# Patient Record
Sex: MALE | Race: WHITE | Employment: OTHER | URBAN - METROPOLITAN AREA
[De-identification: names, ages, dates, MRNs, and addresses within clinical notes are randomized per-mention and may not be internally consistent; named-entity substitution may affect disease eponyms.]

---

## 2018-01-01 ENCOUNTER — APPOINTMENT (OUTPATIENT)
Dept: GENERAL RADIOLOGY | Age: 73
DRG: 871 | End: 2018-01-01
Payer: MEDICARE

## 2018-01-01 ENCOUNTER — APPOINTMENT (OUTPATIENT)
Dept: CT IMAGING | Age: 73
DRG: 871 | End: 2018-01-01
Payer: MEDICARE

## 2018-01-01 ENCOUNTER — HOSPITAL ENCOUNTER (INPATIENT)
Age: 73
LOS: 7 days | DRG: 871 | End: 2018-10-23
Attending: EMERGENCY MEDICINE | Admitting: INTERNAL MEDICINE
Payer: MEDICARE

## 2018-01-01 VITALS
HEIGHT: 69 IN | TEMPERATURE: 97.8 F | OXYGEN SATURATION: 91 % | HEART RATE: 109 BPM | SYSTOLIC BLOOD PRESSURE: 152 MMHG | RESPIRATION RATE: 20 BRPM | DIASTOLIC BLOOD PRESSURE: 76 MMHG | WEIGHT: 108.3 LBS | BODY MASS INDEX: 16.04 KG/M2

## 2018-01-01 DIAGNOSIS — J96.92 RESPIRATORY FAILURE WITH HYPOXIA AND HYPERCAPNIA, UNSPECIFIED CHRONICITY (HCC): Primary | ICD-10-CM

## 2018-01-01 DIAGNOSIS — J96.91 RESPIRATORY FAILURE WITH HYPOXIA AND HYPERCAPNIA, UNSPECIFIED CHRONICITY (HCC): Primary | ICD-10-CM

## 2018-01-01 DIAGNOSIS — E87.1 HYPO-OSMOLAR HYPONATREMIA: ICD-10-CM

## 2018-01-01 LAB
AADO2: 206.1 MMHG
AADO2: 206.4 MMHG
ALBUMIN SERPL-MCNC: 3.1 G/DL (ref 3.5–5.2)
ALBUMIN SERPL-MCNC: 3.8 G/DL (ref 3.5–5.2)
ALP BLD-CCNC: 117 U/L (ref 40–129)
ALP BLD-CCNC: 161 U/L (ref 40–129)
ALT SERPL-CCNC: 21 U/L (ref 0–40)
ALT SERPL-CCNC: 22 U/L (ref 0–40)
AMMONIA: 25 UMOL/L (ref 16–60)
ANION GAP SERPL CALCULATED.3IONS-SCNC: 10 MMOL/L (ref 7–16)
ANION GAP SERPL CALCULATED.3IONS-SCNC: 11 MMOL/L (ref 7–16)
ANION GAP SERPL CALCULATED.3IONS-SCNC: 12 MMOL/L (ref 7–16)
ANION GAP SERPL CALCULATED.3IONS-SCNC: 13 MMOL/L (ref 7–16)
ANION GAP SERPL CALCULATED.3IONS-SCNC: 13 MMOL/L (ref 7–16)
ANION GAP SERPL CALCULATED.3IONS-SCNC: 15 MMOL/L (ref 7–16)
ANION GAP SERPL CALCULATED.3IONS-SCNC: 6 MMOL/L (ref 7–16)
ANION GAP SERPL CALCULATED.3IONS-SCNC: 6 MMOL/L (ref 7–16)
ANION GAP SERPL CALCULATED.3IONS-SCNC: 7 MMOL/L (ref 7–16)
ANION GAP SERPL CALCULATED.3IONS-SCNC: 8 MMOL/L (ref 7–16)
ANION GAP SERPL CALCULATED.3IONS-SCNC: 8 MMOL/L (ref 7–16)
ANION GAP SERPL CALCULATED.3IONS-SCNC: 9 MMOL/L (ref 7–16)
ANISOCYTOSIS: ABNORMAL
AST SERPL-CCNC: 35 U/L (ref 0–39)
AST SERPL-CCNC: 43 U/L (ref 0–39)
B.E.: 1.5 MMOL/L (ref -3–3)
B.E.: 2 MMOL/L (ref -3–3)
B.E.: 7.3 MMOL/L (ref -3–3)
B.E.: 8.7 MMOL/L (ref -3–0)
BACTERIA: ABNORMAL /HPF
BACTERIA: ABNORMAL /HPF
BASOPHILS ABSOLUTE: 0 E9/L (ref 0–0.2)
BASOPHILS ABSOLUTE: 0.01 E9/L (ref 0–0.2)
BASOPHILS RELATIVE PERCENT: 0.1 % (ref 0–2)
BASOPHILS RELATIVE PERCENT: 0.2 % (ref 0–2)
BILIRUB SERPL-MCNC: 0.6 MG/DL (ref 0–1.2)
BILIRUB SERPL-MCNC: 1.2 MG/DL (ref 0–1.2)
BILIRUBIN URINE: NEGATIVE
BILIRUBIN URINE: NEGATIVE
BLOOD, URINE: ABNORMAL
BLOOD, URINE: ABNORMAL
BUN BLDV-MCNC: 10 MG/DL (ref 8–23)
BUN BLDV-MCNC: 11 MG/DL (ref 8–23)
BUN BLDV-MCNC: 7 MG/DL (ref 8–23)
BUN BLDV-MCNC: 8 MG/DL (ref 8–23)
BUN BLDV-MCNC: 9 MG/DL (ref 8–23)
BURR CELLS: ABNORMAL
C-REACTIVE PROTEIN: 6.6 MG/DL (ref 0–0.4)
CALCIUM IONIZED: 1.05 MMOL/L (ref 1.15–1.33)
CALCIUM SERPL-MCNC: 7.4 MG/DL (ref 8.6–10.2)
CALCIUM SERPL-MCNC: 7.6 MG/DL (ref 8.6–10.2)
CALCIUM SERPL-MCNC: 7.7 MG/DL (ref 8.6–10.2)
CALCIUM SERPL-MCNC: 7.7 MG/DL (ref 8.6–10.2)
CALCIUM SERPL-MCNC: 7.8 MG/DL (ref 8.6–10.2)
CALCIUM SERPL-MCNC: 7.9 MG/DL (ref 8.6–10.2)
CALCIUM SERPL-MCNC: 8 MG/DL (ref 8.6–10.2)
CALCIUM SERPL-MCNC: 8.1 MG/DL (ref 8.6–10.2)
CALCIUM SERPL-MCNC: 8.4 MG/DL (ref 8.6–10.2)
CALCIUM SERPL-MCNC: 8.5 MG/DL (ref 8.6–10.2)
CALCIUM SERPL-MCNC: 8.6 MG/DL (ref 8.6–10.2)
CALCIUM SERPL-MCNC: 9 MG/DL (ref 8.6–10.2)
CHLORIDE BLD-SCNC: 69 MMOL/L (ref 98–107)
CHLORIDE BLD-SCNC: 76 MMOL/L (ref 98–107)
CHLORIDE BLD-SCNC: 77 MMOL/L (ref 98–107)
CHLORIDE BLD-SCNC: 78 MMOL/L (ref 98–107)
CHLORIDE BLD-SCNC: 79 MMOL/L (ref 98–107)
CHLORIDE BLD-SCNC: 80 MMOL/L (ref 98–107)
CHLORIDE BLD-SCNC: 81 MMOL/L (ref 98–107)
CHLORIDE BLD-SCNC: 81 MMOL/L (ref 98–107)
CHLORIDE BLD-SCNC: 82 MMOL/L (ref 98–107)
CHLORIDE BLD-SCNC: 82 MMOL/L (ref 98–107)
CHLORIDE BLD-SCNC: 83 MMOL/L (ref 98–107)
CHLORIDE BLD-SCNC: 85 MMOL/L (ref 98–107)
CHLORIDE BLD-SCNC: 85 MMOL/L (ref 98–107)
CHLORIDE BLD-SCNC: 86 MMOL/L (ref 98–107)
CHLORIDE BLD-SCNC: 87 MMOL/L (ref 98–107)
CHLORIDE BLD-SCNC: 88 MMOL/L (ref 98–107)
CHLORIDE BLD-SCNC: 92 MMOL/L (ref 98–107)
CHLORIDE BLD-SCNC: 92 MMOL/L (ref 98–107)
CHLORIDE BLD-SCNC: 93 MMOL/L (ref 98–107)
CHLORIDE URINE RANDOM: 27 MMOL/L
CHLORIDE URINE RANDOM: 27 MMOL/L
CHLORIDE URINE RANDOM: 84 MMOL/L
CLARITY: ABNORMAL
CLARITY: ABNORMAL
CO2: 28 MMOL/L (ref 22–29)
CO2: 29 MMOL/L (ref 22–29)
CO2: 30 MMOL/L (ref 22–29)
CO2: 31 MMOL/L (ref 22–29)
CO2: 32 MMOL/L (ref 22–29)
CO2: 32 MMOL/L (ref 22–29)
CO2: 33 MMOL/L (ref 22–29)
CO2: 34 MMOL/L (ref 22–29)
CO2: 35 MMOL/L (ref 22–29)
CO2: 36 MMOL/L (ref 22–29)
COHB: 1.1 % (ref 0–1.5)
COHB: 1.8 % (ref 0–1.5)
COHB: 2.5 % (ref 0–1.5)
COLOR: YELLOW
COLOR: YELLOW
CORTISOL TOTAL: 16.8 MCG/DL (ref 2.68–18.4)
CREAT SERPL-MCNC: 0.3 MG/DL (ref 0.7–1.2)
CREAT SERPL-MCNC: 0.4 MG/DL (ref 0.7–1.2)
CREATININE URINE: 72 MG/DL (ref 40–278)
CREATININE URINE: 84 MG/DL (ref 40–278)
CRITICAL: ABNORMAL
DATE ANALYZED: ABNORMAL
DATE OF COLLECTION: ABNORMAL
DELIVERY SYSTEMS: ABNORMAL
DEVICE: ABNORMAL
EKG ATRIAL RATE: 102 BPM
EKG P AXIS: 86 DEGREES
EKG P-R INTERVAL: 146 MS
EKG Q-T INTERVAL: 360 MS
EKG QRS DURATION: 102 MS
EKG QTC CALCULATION (BAZETT): 469 MS
EKG R AXIS: -66 DEGREES
EKG T AXIS: 86 DEGREES
EKG VENTRICULAR RATE: 102 BPM
EOSINOPHILS ABSOLUTE: 0 E9/L (ref 0.05–0.5)
EOSINOPHILS ABSOLUTE: 0.02 E9/L (ref 0.05–0.5)
EOSINOPHILS ABSOLUTE: 0.03 E9/L (ref 0.05–0.5)
EOSINOPHILS ABSOLUTE: 0.06 E9/L (ref 0.05–0.5)
EOSINOPHILS RELATIVE PERCENT: 0.2 % (ref 0–6)
EOSINOPHILS RELATIVE PERCENT: 0.5 % (ref 0–6)
EOSINOPHILS RELATIVE PERCENT: 0.5 % (ref 0–6)
EOSINOPHILS RELATIVE PERCENT: 0.6 % (ref 0–6)
ETHANOL: 25 MG/DL (ref 0–0.08)
FILM ARRAY ADENOVIRUS: NORMAL
FILM ARRAY BORDETELLA PERTUSSIS: NORMAL
FILM ARRAY CHLAMYDOPHILIA PNEUMONIAE: NORMAL
FILM ARRAY CORONAVIRUS 229E: NORMAL
FILM ARRAY CORONAVIRUS HKU1: NORMAL
FILM ARRAY CORONAVIRUS NL63: NORMAL
FILM ARRAY CORONAVIRUS OC43: NORMAL
FILM ARRAY INFLUENZA A VIRUS 09H1: NORMAL
FILM ARRAY INFLUENZA A VIRUS H1: NORMAL
FILM ARRAY INFLUENZA A VIRUS H3: NORMAL
FILM ARRAY INFLUENZA A VIRUS: NORMAL
FILM ARRAY INFLUENZA B: NORMAL
FILM ARRAY METAPNEUMOVIRUS: NORMAL
FILM ARRAY MYCOPLASMA PNEUMONIAE: NORMAL
FILM ARRAY PARAINFLUENZA VIRUS 1: NORMAL
FILM ARRAY PARAINFLUENZA VIRUS 2: NORMAL
FILM ARRAY PARAINFLUENZA VIRUS 3: NORMAL
FILM ARRAY PARAINFLUENZA VIRUS 4: NORMAL
FILM ARRAY RESPIRATORY SYNCITIAL VIRUS: NORMAL
FILM ARRAY RHINOVIRUS/ENTEROVIRUS: NORMAL
FIO2 ARTERIAL: 3
FIO2: 50 %
FIO2: 50 %
GFR AFRICAN AMERICAN: >60
GFR NON-AFRICAN AMERICAN: >60 ML/MIN/1.73
GLUCOSE BLD-MCNC: 104 MG/DL (ref 74–109)
GLUCOSE BLD-MCNC: 105 MG/DL (ref 74–109)
GLUCOSE BLD-MCNC: 113 MG/DL (ref 74–109)
GLUCOSE BLD-MCNC: 117 MG/DL (ref 74–109)
GLUCOSE BLD-MCNC: 119 MG/DL (ref 74–109)
GLUCOSE BLD-MCNC: 121 MG/DL (ref 74–109)
GLUCOSE BLD-MCNC: 122 MG/DL (ref 74–109)
GLUCOSE BLD-MCNC: 122 MG/DL (ref 74–109)
GLUCOSE BLD-MCNC: 127 MG/DL (ref 74–109)
GLUCOSE BLD-MCNC: 129 MG/DL (ref 74–109)
GLUCOSE BLD-MCNC: 133 MG/DL (ref 74–109)
GLUCOSE BLD-MCNC: 134 MG/DL (ref 74–109)
GLUCOSE BLD-MCNC: 135 MG/DL (ref 74–109)
GLUCOSE BLD-MCNC: 136 MG/DL (ref 74–109)
GLUCOSE BLD-MCNC: 140 MG/DL (ref 74–109)
GLUCOSE BLD-MCNC: 141 MG/DL (ref 74–109)
GLUCOSE BLD-MCNC: 142 MG/DL (ref 74–109)
GLUCOSE BLD-MCNC: 147 MG/DL (ref 74–109)
GLUCOSE BLD-MCNC: 148 MG/DL (ref 74–109)
GLUCOSE BLD-MCNC: 156 MG/DL (ref 74–109)
GLUCOSE BLD-MCNC: 160 MG/DL (ref 74–109)
GLUCOSE BLD-MCNC: 178 MG/DL (ref 74–109)
GLUCOSE BLD-MCNC: 88 MG/DL (ref 74–109)
GLUCOSE BLD-MCNC: 99 MG/DL (ref 74–109)
GLUCOSE URINE: NEGATIVE MG/DL
GLUCOSE URINE: NEGATIVE MG/DL
HCO3 ARTERIAL: 36.3 MMOL/L (ref 22–26)
HCO3: 29.4 MMOL/L (ref 22–26)
HCO3: 30 MMOL/L (ref 22–26)
HCO3: 33.8 MMOL/L (ref 22–26)
HCT VFR BLD CALC: 31 % (ref 37–54)
HCT VFR BLD CALC: 38.3 % (ref 37–54)
HCT VFR BLD CALC: 39.2 % (ref 37–54)
HCT VFR BLD CALC: 45.7 % (ref 37–54)
HEMOGLOBIN: 10.3 G/DL (ref 12.5–16.5)
HEMOGLOBIN: 13.2 G/DL (ref 12.5–16.5)
HEMOGLOBIN: 13.2 G/DL (ref 12.5–16.5)
HEMOGLOBIN: 16.4 G/DL (ref 12.5–16.5)
HHB: 44.7 % (ref 0–5)
HHB: 5 % (ref 0–5)
HHB: 5.9 % (ref 0–5)
IMMATURE GRANULOCYTES #: 0.04 E9/L
IMMATURE GRANULOCYTES #: 0.04 E9/L
IMMATURE GRANULOCYTES #: 0.08 E9/L
IMMATURE GRANULOCYTES %: 0.5 % (ref 0–5)
IMMATURE GRANULOCYTES %: 0.6 % (ref 0–5)
IMMATURE GRANULOCYTES %: 0.7 % (ref 0–5)
KETONES, URINE: 15 MG/DL
KETONES, URINE: 40 MG/DL
L. PNEUMOPHILA SEROGP 1 UR AG: NORMAL
LAB: 9558
LAB: ABNORMAL
LAB: ABNORMAL
LACTIC ACID: 2 MMOL/L (ref 0.5–2.2)
LEUKOCYTE ESTERASE, URINE: ABNORMAL
LEUKOCYTE ESTERASE, URINE: ABNORMAL
LV EF: 38 %
LVEF MODALITY: NORMAL
LYMPHOCYTES ABSOLUTE: 0.13 E9/L (ref 1.5–4)
LYMPHOCYTES ABSOLUTE: 0.14 E9/L (ref 1.5–4)
LYMPHOCYTES ABSOLUTE: 0.36 E9/L (ref 1.5–4)
LYMPHOCYTES ABSOLUTE: 0.96 E9/L (ref 1.5–4)
LYMPHOCYTES RELATIVE PERCENT: 0.9 % (ref 20–42)
LYMPHOCYTES RELATIVE PERCENT: 1 % (ref 20–42)
LYMPHOCYTES RELATIVE PERCENT: 16.4 % (ref 20–42)
LYMPHOCYTES RELATIVE PERCENT: 4.2 % (ref 20–42)
Lab: ABNORMAL
MAGNESIUM: 1.9 MG/DL (ref 1.6–2.6)
MAGNESIUM: 2 MG/DL (ref 1.6–2.6)
MAGNESIUM: 2.1 MG/DL (ref 1.6–2.6)
MCH RBC QN AUTO: 33.2 PG (ref 26–35)
MCH RBC QN AUTO: 33.3 PG (ref 26–35)
MCH RBC QN AUTO: 33.3 PG (ref 26–35)
MCH RBC QN AUTO: 33.8 PG (ref 26–35)
MCHC RBC AUTO-ENTMCNC: 33.2 % (ref 32–34.5)
MCHC RBC AUTO-ENTMCNC: 33.7 % (ref 32–34.5)
MCHC RBC AUTO-ENTMCNC: 34.5 % (ref 32–34.5)
MCHC RBC AUTO-ENTMCNC: 35.9 % (ref 32–34.5)
MCV RBC AUTO: 100.3 FL (ref 80–99.9)
MCV RBC AUTO: 94.2 FL (ref 80–99.9)
MCV RBC AUTO: 96.7 FL (ref 80–99.9)
MCV RBC AUTO: 98.5 FL (ref 80–99.9)
METHB: 0.2 % (ref 0–1.5)
METHB: 0.3 % (ref 0–1.5)
METHB: 0.3 % (ref 0–1.5)
MODE: ABNORMAL
MONOCYTES ABSOLUTE: 0.16 E9/L (ref 0.1–0.95)
MONOCYTES ABSOLUTE: 0.46 E9/L (ref 0.1–0.95)
MONOCYTES ABSOLUTE: 0.56 E9/L (ref 0.1–0.95)
MONOCYTES ABSOLUTE: 0.62 E9/L (ref 0.1–0.95)
MONOCYTES RELATIVE PERCENT: 1.3 % (ref 2–12)
MONOCYTES RELATIVE PERCENT: 10.6 % (ref 2–12)
MONOCYTES RELATIVE PERCENT: 4.3 % (ref 2–12)
MONOCYTES RELATIVE PERCENT: 5.4 % (ref 2–12)
MRSA CULTURE ONLY: NORMAL
NEUTROPHILS ABSOLUTE: 12.19 E9/L (ref 1.8–7.3)
NEUTROPHILS ABSOLUTE: 13.3 E9/L (ref 1.8–7.3)
NEUTROPHILS ABSOLUTE: 4.18 E9/L (ref 1.8–7.3)
NEUTROPHILS ABSOLUTE: 7.66 E9/L (ref 1.8–7.3)
NEUTROPHILS RELATIVE PERCENT: 71.6 % (ref 43–80)
NEUTROPHILS RELATIVE PERCENT: 89.6 % (ref 43–80)
NEUTROPHILS RELATIVE PERCENT: 94.8 % (ref 43–80)
NEUTROPHILS RELATIVE PERCENT: 96.5 % (ref 43–80)
NITRITE, URINE: POSITIVE
NITRITE, URINE: POSITIVE
O2 CONTENT: 11 ML/DL
O2 CONTENT: 18.5 ML/DL
O2 CONTENT: 19.2 ML/DL
O2 SATURATION: 54 % (ref 92–98.5)
O2 SATURATION: 94 % (ref 92–98.5)
O2 SATURATION: 94.9 % (ref 92–98.5)
O2 SATURATION: 97.8 % (ref 92–98.5)
O2HB: 52.5 % (ref 94–97)
O2HB: 92 % (ref 94–97)
O2HB: 93.7 % (ref 94–97)
OPERATOR ID: 1868
OPERATOR ID: 1868
OPERATOR ID: 1893
OPERATOR ID: 1994
ORGANISM: ABNORMAL
OSMOLALITY URINE: 453 MOSM/KG (ref 300–900)
OSMOLALITY URINE: 508 MOSM/KG (ref 300–900)
OSMOLALITY URINE: 526 MOSM/KG (ref 300–900)
OSMOLALITY: 257 MOSM/KG (ref 285–310)
PATIENT TEMP: 37 C
PCO2 ARTERIAL: 62.4 MMHG (ref 35–45)
PCO2: 55.5 MMHG (ref 35–45)
PCO2: 57.8 MMHG (ref 35–45)
PCO2: 64.8 MMHG (ref 35–45)
PDW BLD-RTO: 12.1 FL (ref 11.5–15)
PDW BLD-RTO: 12.2 FL (ref 11.5–15)
PDW BLD-RTO: 12.3 FL (ref 11.5–15)
PDW BLD-RTO: 12.7 FL (ref 11.5–15)
PEEP/CPAP: 6 CMH2O
PEEP/CPAP: 6 CMH2O
PFO2: 1.46 MMHG/%
PFO2: 1.5 MMHG/%
PH BLOOD GAS: 7.28 (ref 7.35–7.45)
PH BLOOD GAS: 7.33 (ref 7.35–7.45)
PH BLOOD GAS: 7.37 (ref 7.35–7.45)
PH BLOOD GAS: 7.4 (ref 7.35–7.45)
PH UA: 6 (ref 5–9)
PH UA: 6 (ref 5–9)
PHOSPHORUS: 1.2 MG/DL (ref 2.5–4.5)
PHOSPHORUS: 2.2 MG/DL (ref 2.5–4.5)
PHOSPHORUS: 2.3 MG/DL (ref 2.5–4.5)
PHOSPHORUS: 2.9 MG/DL (ref 2.5–4.5)
PHOSPHORUS: 3.5 MG/DL (ref 2.5–4.5)
PLATELET # BLD: 179 E9/L (ref 130–450)
PLATELET # BLD: 209 E9/L (ref 130–450)
PLATELET # BLD: 241 E9/L (ref 130–450)
PLATELET # BLD: 306 E9/L (ref 130–450)
PMV BLD AUTO: 9.2 FL (ref 7–12)
PMV BLD AUTO: 9.4 FL (ref 7–12)
PMV BLD AUTO: 9.6 FL (ref 7–12)
PMV BLD AUTO: 9.7 FL (ref 7–12)
PO2 ARTERIAL: 107.8 MMHG (ref 60–80)
PO2: 31.4 MMHG (ref 60–100)
PO2: 72.9 MMHG (ref 60–100)
PO2: 75.2 MMHG (ref 60–100)
POC CHLORIDE: 77 MMOL/L (ref 100–108)
POC CREATININE: 0.4 MG/DL (ref 0.7–1.2)
POC POTASSIUM: 2.8 MMOL/L (ref 3.5–5)
POC SODIUM: 117 MMOL/L (ref 132–146)
POIKILOCYTES: ABNORMAL
POTASSIUM REFLEX MAGNESIUM: 5 MMOL/L (ref 3.5–5)
POTASSIUM SERPL-SCNC: 3 MMOL/L (ref 3.5–5)
POTASSIUM SERPL-SCNC: 3.4 MMOL/L (ref 3.5–5)
POTASSIUM SERPL-SCNC: 3.4 MMOL/L (ref 3.5–5)
POTASSIUM SERPL-SCNC: 3.5 MMOL/L (ref 3.5–5)
POTASSIUM SERPL-SCNC: 3.8 MMOL/L (ref 3.5–5)
POTASSIUM SERPL-SCNC: 3.8 MMOL/L (ref 3.5–5)
POTASSIUM SERPL-SCNC: 3.9 MMOL/L (ref 3.5–5)
POTASSIUM SERPL-SCNC: 4 MMOL/L (ref 3.5–5)
POTASSIUM SERPL-SCNC: 4.1 MMOL/L (ref 3.5–5)
POTASSIUM SERPL-SCNC: 4.2 MMOL/L (ref 3.5–5)
POTASSIUM SERPL-SCNC: 4.2 MMOL/L (ref 3.5–5)
POTASSIUM SERPL-SCNC: 4.3 MMOL/L (ref 3.5–5)
POTASSIUM SERPL-SCNC: 4.4 MMOL/L (ref 3.5–5)
POTASSIUM SERPL-SCNC: 5.2 MMOL/L (ref 3.5–5)
POTASSIUM SERPL-SCNC: 6.9 MMOL/L (ref 3.5–5)
POTASSIUM, UR: 12 MMOL/L
POTASSIUM, UR: 12.8 MMOL/L
POTASSIUM, UR: 18.2 MMOL/L
PRO-BNP: 3451 PG/ML (ref 0–125)
PROCALCITONIN: 0.06 NG/ML (ref 0–0.08)
PROCALCITONIN: 0.07 NG/ML (ref 0–0.08)
PROCALCITONIN: 0.08 NG/ML (ref 0–0.08)
PROTEIN UA: 30 MG/DL
PROTEIN UA: ABNORMAL MG/DL
PS: 12 CMH20
PS: 12 CMH20
RBC # BLD: 3.09 E12/L (ref 3.8–5.8)
RBC # BLD: 3.96 E12/L (ref 3.8–5.8)
RBC # BLD: 3.98 E12/L (ref 3.8–5.8)
RBC # BLD: 4.85 E12/L (ref 3.8–5.8)
RBC UA: ABNORMAL /HPF (ref 0–2)
RBC UA: ABNORMAL /HPF (ref 0–2)
RI(T): 2.74
RI(T): 283 %
SCHISTOCYTES: ABNORMAL
SODIUM BLD-SCNC: 118 MMOL/L (ref 132–146)
SODIUM BLD-SCNC: 119 MMOL/L (ref 132–146)
SODIUM BLD-SCNC: 120 MMOL/L (ref 132–146)
SODIUM BLD-SCNC: 120 MMOL/L (ref 132–146)
SODIUM BLD-SCNC: 121 MMOL/L (ref 132–146)
SODIUM BLD-SCNC: 123 MMOL/L (ref 132–146)
SODIUM BLD-SCNC: 124 MMOL/L (ref 132–146)
SODIUM BLD-SCNC: 125 MMOL/L (ref 132–146)
SODIUM BLD-SCNC: 127 MMOL/L (ref 132–146)
SODIUM BLD-SCNC: 128 MMOL/L (ref 132–146)
SODIUM BLD-SCNC: 128 MMOL/L (ref 132–146)
SODIUM BLD-SCNC: 129 MMOL/L (ref 132–146)
SODIUM BLD-SCNC: 129 MMOL/L (ref 132–146)
SODIUM BLD-SCNC: 132 MMOL/L (ref 132–146)
SODIUM BLD-SCNC: 132 MMOL/L (ref 132–146)
SODIUM BLD-SCNC: 134 MMOL/L (ref 132–146)
SODIUM URINE: 51 MMOL/L
SODIUM URINE: <20 MMOL/L
SODIUM URINE: <20 MMOL/L
SOURCE, BLOOD GAS: ABNORMAL
SPECIFIC GRAVITY UA: 1.02 (ref 1–1.03)
SPECIFIC GRAVITY UA: >=1.03 (ref 1–1.03)
STREP PNEUMONIAE ANTIGEN, URINE: NORMAL
THB: 14 G/DL (ref 11.5–16.5)
THB: 14.8 G/DL (ref 11.5–16.5)
THB: 14.9 G/DL (ref 11.5–16.5)
TIME ANALYZED: 1533
TIME ANALYZED: 1627
TIME ANALYZED: 2244
TOTAL CK: 135 U/L (ref 20–200)
TOTAL PROTEIN: 5.6 G/DL (ref 6.4–8.3)
TOTAL PROTEIN: 6.7 G/DL (ref 6.4–8.3)
TROPONIN: 0.02 NG/ML (ref 0–0.03)
URINE CULTURE, ROUTINE: ABNORMAL
URINE CULTURE, ROUTINE: ABNORMAL
UROBILINOGEN, URINE: 0.2 E.U./DL
UROBILINOGEN, URINE: 1 E.U./DL
VITAMIN D 1,25-DIHYDROXY: 69.1 PG/ML (ref 19.9–79.3)
WBC # BLD: 12.6 E9/L (ref 4.5–11.5)
WBC # BLD: 14 E9/L (ref 4.5–11.5)
WBC # BLD: 5.8 E9/L (ref 4.5–11.5)
WBC # BLD: 8.6 E9/L (ref 4.5–11.5)
WBC UA: ABNORMAL /HPF (ref 0–5)
WBC UA: ABNORMAL /HPF (ref 0–5)

## 2018-01-01 PROCEDURE — 6370000000 HC RX 637 (ALT 250 FOR IP): Performed by: INTERNAL MEDICINE

## 2018-01-01 PROCEDURE — 6360000002 HC RX W HCPCS: Performed by: STUDENT IN AN ORGANIZED HEALTH CARE EDUCATION/TRAINING PROGRAM

## 2018-01-01 PROCEDURE — 94660 CPAP INITIATION&MGMT: CPT

## 2018-01-01 PROCEDURE — 2500000003 HC RX 250 WO HCPCS: Performed by: INTERNAL MEDICINE

## 2018-01-01 PROCEDURE — 6360000002 HC RX W HCPCS: Performed by: INTERNAL MEDICINE

## 2018-01-01 PROCEDURE — 94640 AIRWAY INHALATION TREATMENT: CPT

## 2018-01-01 PROCEDURE — 86140 C-REACTIVE PROTEIN: CPT

## 2018-01-01 PROCEDURE — 84100 ASSAY OF PHOSPHORUS: CPT

## 2018-01-01 PROCEDURE — 6370000000 HC RX 637 (ALT 250 FOR IP): Performed by: NURSE PRACTITIONER

## 2018-01-01 PROCEDURE — 84484 ASSAY OF TROPONIN QUANT: CPT

## 2018-01-01 PROCEDURE — 80053 COMPREHEN METABOLIC PANEL: CPT

## 2018-01-01 PROCEDURE — G8978 MOBILITY CURRENT STATUS: HCPCS

## 2018-01-01 PROCEDURE — 6370000000 HC RX 637 (ALT 250 FOR IP)

## 2018-01-01 PROCEDURE — 87581 M.PNEUMON DNA AMP PROBE: CPT

## 2018-01-01 PROCEDURE — 2580000003 HC RX 258: Performed by: INTERNAL MEDICINE

## 2018-01-01 PROCEDURE — 6370000000 HC RX 637 (ALT 250 FOR IP): Performed by: FAMILY MEDICINE

## 2018-01-01 PROCEDURE — 82330 ASSAY OF CALCIUM: CPT

## 2018-01-01 PROCEDURE — 85025 COMPLETE CBC W/AUTO DIFF WBC: CPT

## 2018-01-01 PROCEDURE — 92610 EVALUATE SWALLOWING FUNCTION: CPT

## 2018-01-01 PROCEDURE — 2140000000 HC CCU INTERMEDIATE R&B

## 2018-01-01 PROCEDURE — 82436 ASSAY OF URINE CHLORIDE: CPT

## 2018-01-01 PROCEDURE — 97530 THERAPEUTIC ACTIVITIES: CPT

## 2018-01-01 PROCEDURE — 51702 INSERT TEMP BLADDER CATH: CPT

## 2018-01-01 PROCEDURE — 2580000003 HC RX 258: Performed by: STUDENT IN AN ORGANIZED HEALTH CARE EDUCATION/TRAINING PROGRAM

## 2018-01-01 PROCEDURE — 83880 ASSAY OF NATRIURETIC PEPTIDE: CPT

## 2018-01-01 PROCEDURE — 6370000000 HC RX 637 (ALT 250 FOR IP): Performed by: STUDENT IN AN ORGANIZED HEALTH CARE EDUCATION/TRAINING PROGRAM

## 2018-01-01 PROCEDURE — 6360000002 HC RX W HCPCS: Performed by: NURSE PRACTITIONER

## 2018-01-01 PROCEDURE — 82550 ASSAY OF CK (CPK): CPT

## 2018-01-01 PROCEDURE — 2000000000 HC ICU R&B

## 2018-01-01 PROCEDURE — 2700000000 HC OXYGEN THERAPY PER DAY

## 2018-01-01 PROCEDURE — 83605 ASSAY OF LACTIC ACID: CPT

## 2018-01-01 PROCEDURE — 94761 N-INVAS EAR/PLS OXIMETRY MLT: CPT

## 2018-01-01 PROCEDURE — 84300 ASSAY OF URINE SODIUM: CPT

## 2018-01-01 PROCEDURE — 83935 ASSAY OF URINE OSMOLALITY: CPT

## 2018-01-01 PROCEDURE — 96375 TX/PRO/DX INJ NEW DRUG ADDON: CPT

## 2018-01-01 PROCEDURE — 96365 THER/PROPH/DIAG IV INF INIT: CPT

## 2018-01-01 PROCEDURE — 87633 RESP VIRUS 12-25 TARGETS: CPT

## 2018-01-01 PROCEDURE — 71260 CT THORAX DX C+: CPT

## 2018-01-01 PROCEDURE — 87798 DETECT AGENT NOS DNA AMP: CPT

## 2018-01-01 PROCEDURE — 82947 ASSAY GLUCOSE BLOOD QUANT: CPT

## 2018-01-01 PROCEDURE — 84132 ASSAY OF SERUM POTASSIUM: CPT

## 2018-01-01 PROCEDURE — 97162 PT EVAL MOD COMPLEX 30 MIN: CPT

## 2018-01-01 PROCEDURE — 71045 X-RAY EXAM CHEST 1 VIEW: CPT

## 2018-01-01 PROCEDURE — 2500000003 HC RX 250 WO HCPCS: Performed by: FAMILY MEDICINE

## 2018-01-01 PROCEDURE — 87088 URINE BACTERIA CULTURE: CPT

## 2018-01-01 PROCEDURE — 83735 ASSAY OF MAGNESIUM: CPT

## 2018-01-01 PROCEDURE — 87081 CULTURE SCREEN ONLY: CPT

## 2018-01-01 PROCEDURE — 84145 PROCALCITONIN (PCT): CPT

## 2018-01-01 PROCEDURE — 87186 SC STD MICRODIL/AGAR DIL: CPT

## 2018-01-01 PROCEDURE — 99222 1ST HOSP IP/OBS MODERATE 55: CPT | Performed by: NURSE PRACTITIONER

## 2018-01-01 PROCEDURE — 36415 COLL VENOUS BLD VENIPUNCTURE: CPT

## 2018-01-01 PROCEDURE — 80048 BASIC METABOLIC PNL TOTAL CA: CPT

## 2018-01-01 PROCEDURE — 81001 URINALYSIS AUTO W/SCOPE: CPT

## 2018-01-01 PROCEDURE — 96367 TX/PROPH/DG ADDL SEQ IV INF: CPT

## 2018-01-01 PROCEDURE — 97165 OT EVAL LOW COMPLEX 30 MIN: CPT

## 2018-01-01 PROCEDURE — 84133 ASSAY OF URINE POTASSIUM: CPT

## 2018-01-01 PROCEDURE — 6360000004 HC RX CONTRAST MEDICATION: Performed by: RADIOLOGY

## 2018-01-01 PROCEDURE — G8988 SELF CARE GOAL STATUS: HCPCS

## 2018-01-01 PROCEDURE — 99285 EMERGENCY DEPT VISIT HI MDM: CPT

## 2018-01-01 PROCEDURE — 82533 TOTAL CORTISOL: CPT

## 2018-01-01 PROCEDURE — 94760 N-INVAS EAR/PLS OXIMETRY 1: CPT

## 2018-01-01 PROCEDURE — 93306 TTE W/DOPPLER COMPLETE: CPT

## 2018-01-01 PROCEDURE — 82805 BLOOD GASES W/O2 SATURATION: CPT

## 2018-01-01 PROCEDURE — 2580000003 HC RX 258: Performed by: FAMILY MEDICINE

## 2018-01-01 PROCEDURE — 82435 ASSAY OF BLOOD CHLORIDE: CPT

## 2018-01-01 PROCEDURE — G8987 SELF CARE CURRENT STATUS: HCPCS

## 2018-01-01 PROCEDURE — G0480 DRUG TEST DEF 1-7 CLASSES: HCPCS

## 2018-01-01 PROCEDURE — 99232 SBSQ HOSP IP/OBS MODERATE 35: CPT | Performed by: NURSE PRACTITIONER

## 2018-01-01 PROCEDURE — 82803 BLOOD GASES ANY COMBINATION: CPT

## 2018-01-01 PROCEDURE — 87450 HC DIRECT STREP B ANTIGEN: CPT

## 2018-01-01 PROCEDURE — 82565 ASSAY OF CREATININE: CPT

## 2018-01-01 PROCEDURE — 2500000003 HC RX 250 WO HCPCS: Performed by: STUDENT IN AN ORGANIZED HEALTH CARE EDUCATION/TRAINING PROGRAM

## 2018-01-01 PROCEDURE — 82570 ASSAY OF URINE CREATININE: CPT

## 2018-01-01 PROCEDURE — 82652 VIT D 1 25-DIHYDROXY: CPT

## 2018-01-01 PROCEDURE — 97535 SELF CARE MNGMENT TRAINING: CPT

## 2018-01-01 PROCEDURE — G8979 MOBILITY GOAL STATUS: HCPCS

## 2018-01-01 PROCEDURE — 94664 DEMO&/EVAL PT USE INHALER: CPT

## 2018-01-01 PROCEDURE — 83930 ASSAY OF BLOOD OSMOLALITY: CPT

## 2018-01-01 PROCEDURE — 87486 CHLMYD PNEUM DNA AMP PROBE: CPT

## 2018-01-01 PROCEDURE — 82140 ASSAY OF AMMONIA: CPT

## 2018-01-01 PROCEDURE — 70450 CT HEAD/BRAIN W/O DYE: CPT

## 2018-01-01 PROCEDURE — 84295 ASSAY OF SERUM SODIUM: CPT

## 2018-01-01 RX ORDER — 3% SODIUM CHLORIDE 3 G/100ML
25 INJECTION, SOLUTION INTRAVENOUS CONTINUOUS
Status: ACTIVE | OUTPATIENT
Start: 2018-01-01 | End: 2018-01-01

## 2018-01-01 RX ORDER — POTASSIUM CHLORIDE 20 MEQ/1
40 TABLET, EXTENDED RELEASE ORAL ONCE
Status: COMPLETED | OUTPATIENT
Start: 2018-01-01 | End: 2018-01-01

## 2018-01-01 RX ORDER — PREDNISONE 10 MG/1
10 TABLET ORAL DAILY
Status: DISCONTINUED | OUTPATIENT
Start: 2018-10-29 | End: 2018-01-01 | Stop reason: HOSPADM

## 2018-01-01 RX ORDER — POTASSIUM CHLORIDE 20 MEQ/1
40 TABLET, EXTENDED RELEASE ORAL PRN
Status: DISCONTINUED | OUTPATIENT
Start: 2018-01-01 | End: 2018-01-01 | Stop reason: HOSPADM

## 2018-01-01 RX ORDER — POTASSIUM CHLORIDE 7.45 MG/ML
10 INJECTION INTRAVENOUS PRN
Status: DISCONTINUED | OUTPATIENT
Start: 2018-01-01 | End: 2018-01-01 | Stop reason: HOSPADM

## 2018-01-01 RX ORDER — HYDROCHLOROTHIAZIDE 12.5 MG/1
1 CAPSULE, GELATIN COATED ORAL DAILY
Refills: 0 | Status: ON HOLD | COMMUNITY
Start: 2018-01-01 | End: 2018-01-01 | Stop reason: HOSPADM

## 2018-01-01 RX ORDER — AZITHROMYCIN 250 MG/1
250 TABLET, FILM COATED ORAL DAILY
Status: DISCONTINUED | OUTPATIENT
Start: 2018-01-01 | End: 2018-01-01

## 2018-01-01 RX ORDER — LORAZEPAM 2 MG/ML
1 INJECTION INTRAMUSCULAR
Status: DISCONTINUED | OUTPATIENT
Start: 2018-01-01 | End: 2018-01-01

## 2018-01-01 RX ORDER — AZITHROMYCIN 250 MG/1
500 TABLET, FILM COATED ORAL DAILY
Status: COMPLETED | OUTPATIENT
Start: 2018-01-01 | End: 2018-01-01

## 2018-01-01 RX ORDER — FUROSEMIDE 10 MG/ML
20 INJECTION INTRAMUSCULAR; INTRAVENOUS ONCE
Status: COMPLETED | OUTPATIENT
Start: 2018-01-01 | End: 2018-01-01

## 2018-01-01 RX ORDER — HYDRALAZINE HYDROCHLORIDE 20 MG/ML
10 INJECTION INTRAMUSCULAR; INTRAVENOUS EVERY 30 MIN PRN
Status: DISCONTINUED | OUTPATIENT
Start: 2018-01-01 | End: 2018-01-01

## 2018-01-01 RX ORDER — ESCITALOPRAM OXALATE 10 MG/1
20 TABLET ORAL DAILY
Status: DISCONTINUED | OUTPATIENT
Start: 2018-01-01 | End: 2018-01-01 | Stop reason: HOSPADM

## 2018-01-01 RX ORDER — LORAZEPAM 1 MG/1
4 TABLET ORAL
Status: DISCONTINUED | OUTPATIENT
Start: 2018-01-01 | End: 2018-01-01

## 2018-01-01 RX ORDER — LORAZEPAM 2 MG/ML
4 INJECTION INTRAMUSCULAR
Status: DISCONTINUED | OUTPATIENT
Start: 2018-01-01 | End: 2018-01-01

## 2018-01-01 RX ORDER — KETOROLAC TROMETHAMINE 30 MG/ML
15 INJECTION, SOLUTION INTRAMUSCULAR; INTRAVENOUS ONCE
Status: COMPLETED | OUTPATIENT
Start: 2018-01-01 | End: 2018-01-01

## 2018-01-01 RX ORDER — METHYLPREDNISOLONE SODIUM SUCCINATE 40 MG/ML
40 INJECTION, POWDER, LYOPHILIZED, FOR SOLUTION INTRAMUSCULAR; INTRAVENOUS EVERY 12 HOURS
Status: DISCONTINUED | OUTPATIENT
Start: 2018-01-01 | End: 2018-01-01

## 2018-01-01 RX ORDER — SODIUM CHLORIDE 0.9 % (FLUSH) 0.9 %
10 SYRINGE (ML) INJECTION PRN
Status: DISCONTINUED | OUTPATIENT
Start: 2018-01-01 | End: 2018-01-01 | Stop reason: HOSPADM

## 2018-01-01 RX ORDER — ONDANSETRON 2 MG/ML
4 INJECTION INTRAMUSCULAR; INTRAVENOUS EVERY 6 HOURS PRN
Status: DISCONTINUED | OUTPATIENT
Start: 2018-01-01 | End: 2018-01-01 | Stop reason: HOSPADM

## 2018-01-01 RX ORDER — LABETALOL HYDROCHLORIDE 5 MG/ML
10 INJECTION, SOLUTION INTRAVENOUS EVERY 4 HOURS PRN
Status: DISCONTINUED | OUTPATIENT
Start: 2018-01-01 | End: 2018-01-01 | Stop reason: HOSPADM

## 2018-01-01 RX ORDER — LORAZEPAM 1 MG/1
3 TABLET ORAL
Status: DISCONTINUED | OUTPATIENT
Start: 2018-01-01 | End: 2018-01-01

## 2018-01-01 RX ORDER — PREDNISONE 10 MG/1
30 TABLET ORAL DAILY
Status: DISCONTINUED | OUTPATIENT
Start: 2018-01-01 | End: 2018-01-01 | Stop reason: HOSPADM

## 2018-01-01 RX ORDER — POTASSIUM CHLORIDE 20MEQ/15ML
40 LIQUID (ML) ORAL PRN
Status: DISCONTINUED | OUTPATIENT
Start: 2018-01-01 | End: 2018-01-01 | Stop reason: HOSPADM

## 2018-01-01 RX ORDER — 0.9 % SODIUM CHLORIDE 0.9 %
1000 INTRAVENOUS SOLUTION INTRAVENOUS ONCE
Status: COMPLETED | OUTPATIENT
Start: 2018-01-01 | End: 2018-01-01

## 2018-01-01 RX ORDER — FORMOTEROL FUMARATE 20 UG/2ML
20 SOLUTION RESPIRATORY (INHALATION) EVERY 12 HOURS
Status: DISCONTINUED | OUTPATIENT
Start: 2018-01-01 | End: 2018-01-01 | Stop reason: HOSPADM

## 2018-01-01 RX ORDER — PREDNISONE 20 MG/1
20 TABLET ORAL DAILY
Status: DISCONTINUED | OUTPATIENT
Start: 2018-10-26 | End: 2018-01-01 | Stop reason: HOSPADM

## 2018-01-01 RX ORDER — METHYLPREDNISOLONE SODIUM SUCCINATE 40 MG/ML
40 INJECTION, POWDER, LYOPHILIZED, FOR SOLUTION INTRAMUSCULAR; INTRAVENOUS DAILY
Status: DISCONTINUED | OUTPATIENT
Start: 2018-01-01 | End: 2018-01-01

## 2018-01-01 RX ORDER — LORAZEPAM 1 MG/1
2 TABLET ORAL
Status: DISCONTINUED | OUTPATIENT
Start: 2018-01-01 | End: 2018-01-01

## 2018-01-01 RX ORDER — PREDNISONE 20 MG/1
40 TABLET ORAL DAILY
Status: COMPLETED | OUTPATIENT
Start: 2018-01-01 | End: 2018-01-01

## 2018-01-01 RX ORDER — LORAZEPAM 2 MG/ML
3 INJECTION INTRAMUSCULAR
Status: DISCONTINUED | OUTPATIENT
Start: 2018-01-01 | End: 2018-01-01

## 2018-01-01 RX ORDER — FOLIC ACID 1 MG/1
1 TABLET ORAL DAILY
Status: DISCONTINUED | OUTPATIENT
Start: 2018-01-01 | End: 2018-01-01 | Stop reason: HOSPADM

## 2018-01-01 RX ORDER — ACETAMINOPHEN 325 MG/1
650 TABLET ORAL EVERY 4 HOURS PRN
Status: DISCONTINUED | OUTPATIENT
Start: 2018-01-01 | End: 2018-01-01 | Stop reason: HOSPADM

## 2018-01-01 RX ORDER — IPRATROPIUM BROMIDE AND ALBUTEROL SULFATE 2.5; .5 MG/3ML; MG/3ML
1 SOLUTION RESPIRATORY (INHALATION)
Status: DISCONTINUED | OUTPATIENT
Start: 2018-01-01 | End: 2018-01-01 | Stop reason: HOSPADM

## 2018-01-01 RX ORDER — SODIUM CHLORIDE 9 MG/ML
INJECTION, SOLUTION INTRAVENOUS CONTINUOUS
Status: DISCONTINUED | OUTPATIENT
Start: 2018-01-01 | End: 2018-01-01

## 2018-01-01 RX ORDER — PANTOPRAZOLE SODIUM 40 MG/1
40 TABLET, DELAYED RELEASE ORAL
Status: DISCONTINUED | OUTPATIENT
Start: 2018-01-01 | End: 2018-01-01 | Stop reason: HOSPADM

## 2018-01-01 RX ORDER — HYDRALAZINE HYDROCHLORIDE 20 MG/ML
10 INJECTION INTRAMUSCULAR; INTRAVENOUS ONCE
Status: DISCONTINUED | OUTPATIENT
Start: 2018-01-01 | End: 2018-01-01

## 2018-01-01 RX ORDER — LORAZEPAM 1 MG/1
1 TABLET ORAL
Status: DISCONTINUED | OUTPATIENT
Start: 2018-01-01 | End: 2018-01-01

## 2018-01-01 RX ORDER — IPRATROPIUM BROMIDE AND ALBUTEROL SULFATE 2.5; .5 MG/3ML; MG/3ML
3 SOLUTION RESPIRATORY (INHALATION) ONCE
Status: COMPLETED | OUTPATIENT
Start: 2018-01-01 | End: 2018-01-01

## 2018-01-01 RX ORDER — SODIUM CHLORIDE 0.9 % (FLUSH) 0.9 %
10 SYRINGE (ML) INJECTION EVERY 12 HOURS SCHEDULED
Status: DISCONTINUED | OUTPATIENT
Start: 2018-01-01 | End: 2018-01-01 | Stop reason: HOSPADM

## 2018-01-01 RX ORDER — 3% SODIUM CHLORIDE 3 G/100ML
25 INJECTION, SOLUTION INTRAVENOUS CONTINUOUS
Status: DISPENSED | OUTPATIENT
Start: 2018-01-01 | End: 2018-01-01

## 2018-01-01 RX ORDER — BUDESONIDE 1 MG/2ML
1 INHALANT ORAL 2 TIMES DAILY
Status: DISCONTINUED | OUTPATIENT
Start: 2018-01-01 | End: 2018-01-01

## 2018-01-01 RX ORDER — FOLIC ACID 5 MG/ML
1 INJECTION, SOLUTION INTRAMUSCULAR; INTRAVENOUS; SUBCUTANEOUS ONCE
Status: COMPLETED | OUTPATIENT
Start: 2018-01-01 | End: 2018-01-01

## 2018-01-01 RX ORDER — LORAZEPAM 2 MG/ML
2 INJECTION INTRAMUSCULAR
Status: DISCONTINUED | OUTPATIENT
Start: 2018-01-01 | End: 2018-01-01

## 2018-01-01 RX ORDER — POTASSIUM CHLORIDE 7.45 MG/ML
10 INJECTION INTRAVENOUS ONCE
Status: DISCONTINUED | OUTPATIENT
Start: 2018-01-01 | End: 2018-01-01

## 2018-01-01 RX ORDER — BUDESONIDE 0.5 MG/2ML
0.5 INHALANT ORAL 2 TIMES DAILY
Status: DISCONTINUED | OUTPATIENT
Start: 2018-01-01 | End: 2018-01-01 | Stop reason: HOSPADM

## 2018-01-01 RX ORDER — THIAMINE HYDROCHLORIDE 100 MG/ML
100 INJECTION, SOLUTION INTRAMUSCULAR; INTRAVENOUS DAILY
Status: DISCONTINUED | OUTPATIENT
Start: 2018-01-01 | End: 2018-01-01 | Stop reason: HOSPADM

## 2018-01-01 RX ADMIN — CEFTRIAXONE SODIUM 1 G: 1 INJECTION, POWDER, FOR SOLUTION INTRAMUSCULAR; INTRAVENOUS at 15:14

## 2018-01-01 RX ADMIN — CEFTRIAXONE SODIUM 1 G: 1 INJECTION, POWDER, FOR SOLUTION INTRAMUSCULAR; INTRAVENOUS at 15:54

## 2018-01-01 RX ADMIN — SODIUM CHLORIDE: 9 INJECTION, SOLUTION INTRAVENOUS at 06:29

## 2018-01-01 RX ADMIN — IPRATROPIUM BROMIDE AND ALBUTEROL SULFATE 1 AMPULE: .5; 3 SOLUTION RESPIRATORY (INHALATION) at 11:46

## 2018-01-01 RX ADMIN — SODIUM CHLORIDE: 9 INJECTION, SOLUTION INTRAVENOUS at 23:03

## 2018-01-01 RX ADMIN — POTASSIUM CHLORIDE 40 MEQ: 20 TABLET, EXTENDED RELEASE ORAL at 15:14

## 2018-01-01 RX ADMIN — IPRATROPIUM BROMIDE AND ALBUTEROL SULFATE 1 AMPULE: .5; 3 SOLUTION RESPIRATORY (INHALATION) at 12:10

## 2018-01-01 RX ADMIN — ENOXAPARIN SODIUM 40 MG: 40 INJECTION SUBCUTANEOUS at 08:19

## 2018-01-01 RX ADMIN — DICLOFENAC EPOLAMINE 1 PATCH: 0.01 PATCH TOPICAL at 20:38

## 2018-01-01 RX ADMIN — SODIUM CHLORIDE 1000 ML: 9 INJECTION, SOLUTION INTRAVENOUS at 14:55

## 2018-01-01 RX ADMIN — SODIUM CHLORIDE 1000 ML: 9 INJECTION, SOLUTION INTRAVENOUS at 13:10

## 2018-01-01 RX ADMIN — FOLIC ACID 1 MG: 5 INJECTION, SOLUTION INTRAMUSCULAR; INTRAVENOUS; SUBCUTANEOUS at 14:59

## 2018-01-01 RX ADMIN — BUDESONIDE 500 MCG: 0.5 SUSPENSION RESPIRATORY (INHALATION) at 20:32

## 2018-01-01 RX ADMIN — Medication 10 ML: at 20:05

## 2018-01-01 RX ADMIN — Medication 1 MG: at 08:41

## 2018-01-01 RX ADMIN — ACETAMINOPHEN 650 MG: 325 TABLET, FILM COATED ORAL at 16:38

## 2018-01-01 RX ADMIN — Medication 1 MG: at 09:36

## 2018-01-01 RX ADMIN — FORMOTEROL FUMARATE DIHYDRATE 20 MCG: 20 SOLUTION RESPIRATORY (INHALATION) at 12:10

## 2018-01-01 RX ADMIN — CEFTRIAXONE SODIUM 1 G: 1 INJECTION, POWDER, FOR SOLUTION INTRAMUSCULAR; INTRAVENOUS at 17:07

## 2018-01-01 RX ADMIN — FORMOTEROL FUMARATE DIHYDRATE 20 MCG: 20 SOLUTION RESPIRATORY (INHALATION) at 20:32

## 2018-01-01 RX ADMIN — ESCITALOPRAM OXALATE 20 MG: 10 TABLET, FILM COATED ORAL at 09:22

## 2018-01-01 RX ADMIN — FORMOTEROL FUMARATE DIHYDRATE 20 MCG: 20 SOLUTION RESPIRATORY (INHALATION) at 20:06

## 2018-01-01 RX ADMIN — CEFTRIAXONE SODIUM 1 G: 1 INJECTION, POWDER, FOR SOLUTION INTRAMUSCULAR; INTRAVENOUS at 16:13

## 2018-01-01 RX ADMIN — FUROSEMIDE 20 MG: 10 INJECTION, SOLUTION INTRAMUSCULAR; INTRAVENOUS at 14:07

## 2018-01-01 RX ADMIN — Medication 10 ML: at 20:04

## 2018-01-01 RX ADMIN — IPRATROPIUM BROMIDE AND ALBUTEROL SULFATE 1 AMPULE: .5; 3 SOLUTION RESPIRATORY (INHALATION) at 19:56

## 2018-01-01 RX ADMIN — FORMOTEROL FUMARATE DIHYDRATE 20 MCG: 20 SOLUTION RESPIRATORY (INHALATION) at 21:19

## 2018-01-01 RX ADMIN — PANTOPRAZOLE SODIUM 40 MG: 40 TABLET, DELAYED RELEASE ORAL at 06:05

## 2018-01-01 RX ADMIN — SODIUM CHLORIDE 25 ML/HR: 3 INJECTION, SOLUTION INTRAVENOUS at 13:50

## 2018-01-01 RX ADMIN — IPRATROPIUM BROMIDE AND ALBUTEROL SULFATE 1 AMPULE: .5; 3 SOLUTION RESPIRATORY (INHALATION) at 11:25

## 2018-01-01 RX ADMIN — METHYLPREDNISOLONE SODIUM SUCCINATE 40 MG: 40 INJECTION, POWDER, LYOPHILIZED, FOR SOLUTION INTRAMUSCULAR; INTRAVENOUS at 20:32

## 2018-01-01 RX ADMIN — Medication 1 MG: at 09:49

## 2018-01-01 RX ADMIN — CEFTRIAXONE SODIUM 1 G: 1 INJECTION, POWDER, FOR SOLUTION INTRAMUSCULAR; INTRAVENOUS at 20:05

## 2018-01-01 RX ADMIN — DICLOFENAC EPOLAMINE 1 PATCH: 0.01 PATCH TOPICAL at 22:00

## 2018-01-01 RX ADMIN — DICLOFENAC EPOLAMINE 1 PATCH: 0.01 PATCH TOPICAL at 15:13

## 2018-01-01 RX ADMIN — FORMOTEROL FUMARATE DIHYDRATE 20 MCG: 20 SOLUTION RESPIRATORY (INHALATION) at 09:22

## 2018-01-01 RX ADMIN — PREDNISONE 40 MG: 20 TABLET ORAL at 08:19

## 2018-01-01 RX ADMIN — BUDESONIDE 500 MCG: 0.5 SUSPENSION RESPIRATORY (INHALATION) at 08:30

## 2018-01-01 RX ADMIN — BUDESONIDE 500 MCG: 0.5 SUSPENSION RESPIRATORY (INHALATION) at 22:26

## 2018-01-01 RX ADMIN — SODIUM CHLORIDE: 9 INJECTION, SOLUTION INTRAVENOUS at 05:49

## 2018-01-01 RX ADMIN — Medication 10 ML: at 08:37

## 2018-01-01 RX ADMIN — PREDNISONE 40 MG: 20 TABLET ORAL at 09:36

## 2018-01-01 RX ADMIN — POTASSIUM CHLORIDE 40 MEQ: 20 TABLET, EXTENDED RELEASE ORAL at 13:10

## 2018-01-01 RX ADMIN — AZITHROMYCIN 500 MG: 250 TABLET, FILM COATED ORAL at 20:13

## 2018-01-01 RX ADMIN — BUDESONIDE 500 MCG: 0.5 SUSPENSION RESPIRATORY (INHALATION) at 20:07

## 2018-01-01 RX ADMIN — IPRATROPIUM BROMIDE AND ALBUTEROL SULFATE 1 AMPULE: .5; 3 SOLUTION RESPIRATORY (INHALATION) at 20:07

## 2018-01-01 RX ADMIN — THIAMINE HYDROCHLORIDE 100 MG: 100 INJECTION, SOLUTION INTRAMUSCULAR; INTRAVENOUS at 08:42

## 2018-01-01 RX ADMIN — PANTOPRAZOLE SODIUM 40 MG: 40 TABLET, DELAYED RELEASE ORAL at 06:34

## 2018-01-01 RX ADMIN — IPRATROPIUM BROMIDE AND ALBUTEROL SULFATE 1 AMPULE: .5; 3 SOLUTION RESPIRATORY (INHALATION) at 09:00

## 2018-01-01 RX ADMIN — ENOXAPARIN SODIUM 40 MG: 40 INJECTION SUBCUTANEOUS at 08:30

## 2018-01-01 RX ADMIN — LABETALOL HYDROCHLORIDE 10 MG: 5 INJECTION INTRAVENOUS at 20:05

## 2018-01-01 RX ADMIN — Medication 1 MG: at 08:36

## 2018-01-01 RX ADMIN — Medication 1 MG: at 08:19

## 2018-01-01 RX ADMIN — SODIUM CHLORIDE: 9 INJECTION, SOLUTION INTRAVENOUS at 18:50

## 2018-01-01 RX ADMIN — FORMOTEROL FUMARATE DIHYDRATE 20 MCG: 20 SOLUTION RESPIRATORY (INHALATION) at 09:03

## 2018-01-01 RX ADMIN — FORMOTEROL FUMARATE DIHYDRATE 20 MCG: 20 SOLUTION RESPIRATORY (INHALATION) at 19:15

## 2018-01-01 RX ADMIN — BUDESONIDE 500 MCG: 0.5 SUSPENSION RESPIRATORY (INHALATION) at 21:19

## 2018-01-01 RX ADMIN — METHYLPREDNISOLONE SODIUM SUCCINATE 40 MG: 40 INJECTION, POWDER, LYOPHILIZED, FOR SOLUTION INTRAMUSCULAR; INTRAVENOUS at 07:45

## 2018-01-01 RX ADMIN — ESCITALOPRAM OXALATE 20 MG: 10 TABLET, FILM COATED ORAL at 08:18

## 2018-01-01 RX ADMIN — IPRATROPIUM BROMIDE AND ALBUTEROL SULFATE 1 AMPULE: .5; 3 SOLUTION RESPIRATORY (INHALATION) at 15:35

## 2018-01-01 RX ADMIN — THIAMINE HYDROCHLORIDE 100 MG: 100 INJECTION, SOLUTION INTRAMUSCULAR; INTRAVENOUS at 08:19

## 2018-01-01 RX ADMIN — IPRATROPIUM BROMIDE AND ALBUTEROL SULFATE 1 AMPULE: .5; 3 SOLUTION RESPIRATORY (INHALATION) at 20:13

## 2018-01-01 RX ADMIN — DICLOFENAC EPOLAMINE 1 PATCH: 0.01 PATCH TOPICAL at 11:00

## 2018-01-01 RX ADMIN — CEFTRIAXONE SODIUM 1 G: 1 INJECTION, POWDER, FOR SOLUTION INTRAMUSCULAR; INTRAVENOUS at 16:38

## 2018-01-01 RX ADMIN — ESCITALOPRAM OXALATE 20 MG: 10 TABLET, FILM COATED ORAL at 09:35

## 2018-01-01 RX ADMIN — IPRATROPIUM BROMIDE AND ALBUTEROL SULFATE 1 AMPULE: .5; 3 SOLUTION RESPIRATORY (INHALATION) at 16:17

## 2018-01-01 RX ADMIN — FORMOTEROL FUMARATE DIHYDRATE 20 MCG: 20 SOLUTION RESPIRATORY (INHALATION) at 20:13

## 2018-01-01 RX ADMIN — FORMOTEROL FUMARATE DIHYDRATE 20 MCG: 20 SOLUTION RESPIRATORY (INHALATION) at 22:26

## 2018-01-01 RX ADMIN — ACETAMINOPHEN 650 MG: 325 TABLET, FILM COATED ORAL at 19:49

## 2018-01-01 RX ADMIN — BUDESONIDE 500 MCG: 0.5 SUSPENSION RESPIRATORY (INHALATION) at 20:13

## 2018-01-01 RX ADMIN — CEFEPIME HYDROCHLORIDE 2 G: 2 INJECTION, POWDER, FOR SOLUTION INTRAVENOUS at 14:35

## 2018-01-01 RX ADMIN — Medication 10 ML: at 20:39

## 2018-01-01 RX ADMIN — DICLOFENAC EPOLAMINE 1 PATCH: 0.01 PATCH TOPICAL at 21:41

## 2018-01-01 RX ADMIN — VANCOMYCIN HYDROCHLORIDE 1250 MG: 10 INJECTION, POWDER, LYOPHILIZED, FOR SOLUTION INTRAVENOUS at 15:10

## 2018-01-01 RX ADMIN — BUDESONIDE 500 MCG: 0.5 SUSPENSION RESPIRATORY (INHALATION) at 19:15

## 2018-01-01 RX ADMIN — DICLOFENAC EPOLAMINE 1 PATCH: 0.01 PATCH TOPICAL at 23:02

## 2018-01-01 RX ADMIN — SODIUM PHOSPHATE, MONOBASIC, MONOHYDRATE 10 MMOL: 276; 142 INJECTION, SOLUTION INTRAVENOUS at 09:24

## 2018-01-01 RX ADMIN — IPRATROPIUM BROMIDE AND ALBUTEROL SULFATE 1 AMPULE: .5; 3 SOLUTION RESPIRATORY (INHALATION) at 12:21

## 2018-01-01 RX ADMIN — AZITHROMYCIN 250 MG: 250 TABLET, FILM COATED ORAL at 08:30

## 2018-01-01 RX ADMIN — SODIUM CHLORIDE 25 ML/HR: 3 INJECTION, SOLUTION INTRAVENOUS at 11:34

## 2018-01-01 RX ADMIN — Medication 10 ML: at 20:32

## 2018-01-01 RX ADMIN — ACETAMINOPHEN 650 MG: 325 TABLET, FILM COATED ORAL at 04:52

## 2018-01-01 RX ADMIN — Medication 10 ML: at 08:19

## 2018-01-01 RX ADMIN — IPRATROPIUM BROMIDE AND ALBUTEROL SULFATE 1 AMPULE: .5; 3 SOLUTION RESPIRATORY (INHALATION) at 22:26

## 2018-01-01 RX ADMIN — METHYLPREDNISOLONE SODIUM SUCCINATE 40 MG: 40 INJECTION, POWDER, FOR SOLUTION INTRAMUSCULAR; INTRAVENOUS at 09:23

## 2018-01-01 RX ADMIN — CEFTRIAXONE SODIUM 1 G: 1 INJECTION, POWDER, FOR SOLUTION INTRAMUSCULAR; INTRAVENOUS at 15:11

## 2018-01-01 RX ADMIN — ENOXAPARIN SODIUM 40 MG: 40 INJECTION SUBCUTANEOUS at 19:10

## 2018-01-01 RX ADMIN — THIAMINE HYDROCHLORIDE 100 MG: 100 INJECTION, SOLUTION INTRAMUSCULAR; INTRAVENOUS at 09:22

## 2018-01-01 RX ADMIN — Medication 10 ML: at 09:23

## 2018-01-01 RX ADMIN — BUDESONIDE 500 MCG: 0.5 SUSPENSION RESPIRATORY (INHALATION) at 09:22

## 2018-01-01 RX ADMIN — THIAMINE HYDROCHLORIDE 100 MG: 100 INJECTION, SOLUTION INTRAMUSCULAR; INTRAVENOUS at 07:45

## 2018-01-01 RX ADMIN — SODIUM CHLORIDE: 9 INJECTION, SOLUTION INTRAVENOUS at 20:02

## 2018-01-01 RX ADMIN — PANTOPRAZOLE SODIUM 40 MG: 40 TABLET, DELAYED RELEASE ORAL at 06:26

## 2018-01-01 RX ADMIN — IPRATROPIUM BROMIDE AND ALBUTEROL SULFATE 1 AMPULE: .5; 3 SOLUTION RESPIRATORY (INHALATION) at 09:22

## 2018-01-01 RX ADMIN — ACETAMINOPHEN 650 MG: 325 TABLET, FILM COATED ORAL at 15:16

## 2018-01-01 RX ADMIN — IPRATROPIUM BROMIDE AND ALBUTEROL SULFATE 1 AMPULE: .5; 3 SOLUTION RESPIRATORY (INHALATION) at 17:48

## 2018-01-01 RX ADMIN — KETOROLAC TROMETHAMINE 15 MG: 30 INJECTION, SOLUTION INTRAMUSCULAR at 13:10

## 2018-01-01 RX ADMIN — DICLOFENAC EPOLAMINE 1 PATCH: 0.01 PATCH TOPICAL at 09:40

## 2018-01-01 RX ADMIN — ONDANSETRON 4 MG: 2 SOLUTION INTRAMUSCULAR; INTRAVENOUS at 20:09

## 2018-01-01 RX ADMIN — METHYLPREDNISOLONE SODIUM SUCCINATE 40 MG: 40 INJECTION, POWDER, LYOPHILIZED, FOR SOLUTION INTRAMUSCULAR; INTRAVENOUS at 08:43

## 2018-01-01 RX ADMIN — DICLOFENAC EPOLAMINE 1 PATCH: 0.01 PATCH TOPICAL at 20:04

## 2018-01-01 RX ADMIN — THIAMINE HYDROCHLORIDE 100 MG: 100 INJECTION, SOLUTION INTRAMUSCULAR; INTRAVENOUS at 13:11

## 2018-01-01 RX ADMIN — Medication 10 ML: at 08:00

## 2018-01-01 RX ADMIN — ENOXAPARIN SODIUM 40 MG: 40 INJECTION SUBCUTANEOUS at 08:41

## 2018-01-01 RX ADMIN — BUDESONIDE 500 MCG: 0.5 SUSPENSION RESPIRATORY (INHALATION) at 08:50

## 2018-01-01 RX ADMIN — ENOXAPARIN SODIUM 40 MG: 40 INJECTION SUBCUTANEOUS at 09:23

## 2018-01-01 RX ADMIN — ACETAMINOPHEN 650 MG: 325 TABLET, FILM COATED ORAL at 09:23

## 2018-01-01 RX ADMIN — PANTOPRAZOLE SODIUM 40 MG: 40 TABLET, DELAYED RELEASE ORAL at 06:19

## 2018-01-01 RX ADMIN — ESCITALOPRAM OXALATE 20 MG: 10 TABLET, FILM COATED ORAL at 20:13

## 2018-01-01 RX ADMIN — ESCITALOPRAM OXALATE 20 MG: 10 TABLET, FILM COATED ORAL at 08:41

## 2018-01-01 RX ADMIN — ACETAMINOPHEN 650 MG: 325 TABLET, FILM COATED ORAL at 15:13

## 2018-01-01 RX ADMIN — DICLOFENAC EPOLAMINE 1 PATCH: 0.01 PATCH TOPICAL at 22:52

## 2018-01-01 RX ADMIN — IPRATROPIUM BROMIDE AND ALBUTEROL SULFATE 1 AMPULE: .5; 3 SOLUTION RESPIRATORY (INHALATION) at 17:14

## 2018-01-01 RX ADMIN — THIAMINE HYDROCHLORIDE 100 MG: 100 INJECTION, SOLUTION INTRAMUSCULAR; INTRAVENOUS at 15:00

## 2018-01-01 RX ADMIN — CALCIUM CHLORIDE 1 G: 100 INJECTION, SOLUTION INTRAVENOUS at 18:50

## 2018-01-01 RX ADMIN — FORMOTEROL FUMARATE DIHYDRATE 20 MCG: 20 SOLUTION RESPIRATORY (INHALATION) at 08:50

## 2018-01-01 RX ADMIN — THIAMINE HYDROCHLORIDE 100 MG: 100 INJECTION, SOLUTION INTRAMUSCULAR; INTRAVENOUS at 08:36

## 2018-01-01 RX ADMIN — ENOXAPARIN SODIUM 40 MG: 40 INJECTION SUBCUTANEOUS at 08:37

## 2018-01-01 RX ADMIN — DICLOFENAC EPOLAMINE 1 PATCH: 0.01 PATCH TOPICAL at 08:36

## 2018-01-01 RX ADMIN — METHYLPREDNISOLONE SODIUM SUCCINATE 40 MG: 40 INJECTION, POWDER, LYOPHILIZED, FOR SOLUTION INTRAMUSCULAR; INTRAVENOUS at 20:06

## 2018-01-01 RX ADMIN — BUDESONIDE 1 MG: 1 SUSPENSION RESPIRATORY (INHALATION) at 09:04

## 2018-01-01 RX ADMIN — Medication 1 MG: at 09:22

## 2018-01-01 RX ADMIN — ESCITALOPRAM OXALATE 20 MG: 10 TABLET, FILM COATED ORAL at 08:36

## 2018-01-01 RX ADMIN — IOPAMIDOL 90 ML: 755 INJECTION, SOLUTION INTRAVENOUS at 12:56

## 2018-01-01 RX ADMIN — IPRATROPIUM BROMIDE AND ALBUTEROL SULFATE 3 AMPULE: .5; 3 SOLUTION RESPIRATORY (INHALATION) at 12:54

## 2018-01-01 RX ADMIN — Medication 10 ML: at 12:56

## 2018-01-01 RX ADMIN — Medication 10 ML: at 08:43

## 2018-01-01 RX ADMIN — ESCITALOPRAM OXALATE 20 MG: 10 TABLET, FILM COATED ORAL at 07:45

## 2018-01-01 RX ADMIN — ENOXAPARIN SODIUM 40 MG: 40 INJECTION SUBCUTANEOUS at 09:35

## 2018-01-01 RX ADMIN — DICLOFENAC EPOLAMINE 1 PATCH: 0.01 PATCH TOPICAL at 08:41

## 2018-01-01 RX ADMIN — IPRATROPIUM BROMIDE AND ALBUTEROL SULFATE 1 AMPULE: .5; 3 SOLUTION RESPIRATORY (INHALATION) at 15:51

## 2018-01-01 RX ADMIN — IPRATROPIUM BROMIDE AND ALBUTEROL SULFATE 1 AMPULE: .5; 3 SOLUTION RESPIRATORY (INHALATION) at 16:18

## 2018-01-01 RX ADMIN — IPRATROPIUM BROMIDE AND ALBUTEROL SULFATE 1 AMPULE: .5; 3 SOLUTION RESPIRATORY (INHALATION) at 21:19

## 2018-01-01 RX ADMIN — BUDESONIDE 500 MCG: 0.5 SUSPENSION RESPIRATORY (INHALATION) at 12:10

## 2018-01-01 RX ADMIN — PANTOPRAZOLE SODIUM 40 MG: 40 TABLET, DELAYED RELEASE ORAL at 15:54

## 2018-01-01 RX ADMIN — FORMOTEROL FUMARATE DIHYDRATE 20 MCG: 20 SOLUTION RESPIRATORY (INHALATION) at 07:30

## 2018-01-01 RX ADMIN — PREDNISONE 40 MG: 20 TABLET ORAL at 08:36

## 2018-01-01 ASSESSMENT — PAIN DESCRIPTION - PAIN TYPE
TYPE: ACUTE PAIN
TYPE: CHRONIC PAIN

## 2018-01-01 ASSESSMENT — PAIN SCALES - GENERAL
PAINLEVEL_OUTOF10: 0
PAINLEVEL_OUTOF10: 2
PAINLEVEL_OUTOF10: 0
PAINLEVEL_OUTOF10: 8
PAINLEVEL_OUTOF10: 0
PAINLEVEL_OUTOF10: 5
PAINLEVEL_OUTOF10: 0
PAINLEVEL_OUTOF10: 2
PAINLEVEL_OUTOF10: 2
PAINLEVEL_OUTOF10: 0
PAINLEVEL_OUTOF10: 8
PAINLEVEL_OUTOF10: 3
PAINLEVEL_OUTOF10: 0
PAINLEVEL_OUTOF10: 8
PAINLEVEL_OUTOF10: 8
PAINLEVEL_OUTOF10: 7
PAINLEVEL_OUTOF10: 2
PAINLEVEL_OUTOF10: 2
PAINLEVEL_OUTOF10: 8
PAINLEVEL_OUTOF10: 0
PAINLEVEL_OUTOF10: 2
PAINLEVEL_OUTOF10: 0
PAINLEVEL_OUTOF10: 3
PAINLEVEL_OUTOF10: 8
PAINLEVEL_OUTOF10: 0

## 2018-01-01 ASSESSMENT — PAIN DESCRIPTION - LOCATION
LOCATION: BACK
LOCATION: BACK
LOCATION: BUTTOCKS;COCCYX
LOCATION: BACK
LOCATION: BACK;BUTTOCKS
LOCATION: BACK
LOCATION: BACK
LOCATION: BACK;BUTTOCKS
LOCATION: BUTTOCKS

## 2018-01-01 ASSESSMENT — PAIN DESCRIPTION - ONSET
ONSET: ON-GOING
ONSET: GRADUAL
ONSET: ON-GOING
ONSET: ON-GOING

## 2018-01-01 ASSESSMENT — ENCOUNTER SYMPTOMS
EYE REDNESS: 0
COUGH: 1
NAUSEA: 0
VOMITING: 0
WHEEZING: 0
SORE THROAT: 0
SINUS PRESSURE: 0
TROUBLE SWALLOWING: 0
PHOTOPHOBIA: 0
EYE PAIN: 0
CHEST TIGHTNESS: 0
SHORTNESS OF BREATH: 1
BLOOD IN STOOL: 0
BACK PAIN: 0
DIARRHEA: 0
ABDOMINAL PAIN: 0
RHINORRHEA: 0
CONSTIPATION: 0
ABDOMINAL DISTENTION: 0

## 2018-01-01 ASSESSMENT — PAIN DESCRIPTION - PROGRESSION
CLINICAL_PROGRESSION: NOT CHANGED
CLINICAL_PROGRESSION: GRADUALLY IMPROVING
CLINICAL_PROGRESSION: NOT CHANGED
CLINICAL_PROGRESSION: NOT CHANGED
CLINICAL_PROGRESSION: GRADUALLY WORSENING

## 2018-01-01 ASSESSMENT — PAIN DESCRIPTION - FREQUENCY
FREQUENCY: CONTINUOUS

## 2018-01-01 ASSESSMENT — PAIN DESCRIPTION - ORIENTATION
ORIENTATION: LOWER
ORIENTATION: LOWER;MID
ORIENTATION: LOWER;MID
ORIENTATION: LOWER
ORIENTATION: LOWER;MID
ORIENTATION: LOWER

## 2018-01-01 ASSESSMENT — PAIN DESCRIPTION - DESCRIPTORS
DESCRIPTORS: ACHING;DISCOMFORT
DESCRIPTORS: DISCOMFORT;ACHING;CONSTANT
DESCRIPTORS: DISCOMFORT;ACHING
DESCRIPTORS: ACHING;DISCOMFORT
DESCRIPTORS: DISCOMFORT;SORE
DESCRIPTORS: CONSTANT;ACHING;DISCOMFORT
DESCRIPTORS: DISCOMFORT;CONSTANT;ACHING
DESCRIPTORS: ACHING

## 2018-10-16 PROBLEM — J96.91 RESPIRATORY FAILURE WITH HYPOXIA AND HYPERCAPNIA (HCC): Status: ACTIVE | Noted: 2018-01-01

## 2018-10-16 PROBLEM — J96.92 RESPIRATORY FAILURE WITH HYPOXIA AND HYPERCAPNIA (HCC): Status: ACTIVE | Noted: 2018-01-01

## 2018-10-16 NOTE — CARE COORDINATION
CM left a voice message for the [address] that patient was brought to [facility] ER per [facility]. Patient is not aware of his PCP name at the [place] or if he fills meds at the [place]. Patient is also not able to state if he is service connected or has travel benefits.   Aj Valencia RN
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
## 2018-10-16 NOTE — ED PROVIDER NOTES
treatment. Patient to have close monitoring of sodium replacement with nephrology consultation. Repeat exam prior to admission with patient showing somewhat improved symptoms on Bipap, resting comfortably in bed, SpO2 improved to 97% on 50% O2.       --------------------------------------------- PAST HISTORY ---------------------------------------------  Past Medical History:  has a past medical history of Erectile dysfunction; Insomnia; Osteoarthritis; Otitis media, chronic, suppurative; Peptic ulcer disease; Radiculopathy, lumbar region; SBO (small bowel obstruction) ([facility] [address] and Tympanic membrane perforation. Past Surgical History:  has a past surgical history that includes Tympanoplasty (4/18/2007); gastrectomy (1985); lumbar laminectomy; Abdominal exploration surgery (6/8/2000); and Colonoscopy (6/7/2011). Social History:  reports that he has been smoking. He has a 57.00 pack-year smoking history. He has never used smokeless tobacco. He reports that he drinks about 0.6 oz of alcohol per week . He reports that he does not use drugs. Family History: family history includes Cancer in his father and maternal grandmother. The patients home medications have been reviewed. Allergies: Patient has no known allergies.     -------------------------------------------------- RESULTS -------------------------------------------------    LABS:  Results for orders placed or performed during the hospital encounter of 10/16/18   Respiratory Panel, Film Array   Result Value Ref Range    Film Array Adenovirus       Result: Not Detected  *  *  Normal Range: Not Detected      Film Array Coronavirus HKU1       Result: Not Detected  *  *  Normal Range: Not Detected      Film Array Conoravirus NL63       Result: Not Detected  *  *  Normal Range: Not Detected      Film Array Coronavirus 229E       Result: Not Detected  *  *  Normal Range: Not Detected      Film Array Coronavirus OC43       Result: Not American >60     Calcium 7.7 (L) 8.6 - 10.2 mg/dL   CBC auto differential   Result Value Ref Range    WBC 12.6 (H) 4.5 - 11.5 E9/L    RBC 3.96 3.80 - 5.80 E12/L    Hemoglobin 13.2 12.5 - 16.5 g/dL    Hematocrit 38.3 37.0 - 54.0 %    MCV 96.7 80.0 - 99.9 fL    MCH 33.3 26.0 - 35.0 pg    MCHC 34.5 32.0 - 34.5 %    RDW 12.2 11.5 - 15.0 fL    Platelets 485 130 - 561 E9/L    MPV 9.7 7.0 - 12.0 fL    Neutrophils % 96.5 (H) 43.0 - 80.0 %    Immature Granulocytes % 0.6 0.0 - 5.0 %    Lymphocytes % 1.0 (L) 20.0 - 42.0 %    Monocytes % 1.3 (L) 2.0 - 12.0 %    Eosinophils % 0.5 0.0 - 6.0 %    Basophils % 0.1 0.0 - 2.0 %    Neutrophils # 12.19 (H) 1.80 - 7.30 E9/L    Immature Granulocytes # 0.08 E9/L    Lymphocytes # 0.13 (L) 1.50 - 4.00 E9/L    Monocytes # 0.16 0.10 - 0.95 E9/L    Eosinophils # 0.06 0.05 - 0.50 E9/L    Basophils # 0.01 0.00 - 0.20 E9/L    Poikilocytes 2+     De Young Cells 2+    Brain Natriuretic Peptide   Result Value Ref Range    Pro-BNP 3,451 (H) 0 - 125 pg/mL   Procalcitonin   Result Value Ref Range    Procalcitonin 0.07 0.00 - 0.08 ng/mL   C-Reactive Protein   Result Value Ref Range    CRP 6.6 (H) 0.0 - 0.4 mg/dL   Blood Gas, Arterial   Result Value Ref Range    Date Analyzed [id number]     Time Analyzed 2244     Source: Blood Arterial     pH, Blood Gas 7.403 7.350 - 7.450    PCO2 55.5 (H) 35.0 - 45.0 mmHg    PO2 75.2 60.0 - 100.0 mmHg    HCO3 33.8 (H) 22.0 - 26.0 mmol/L    B.E. 7.3 (H) -3.0 - 3.0 mmol/L    O2 Sat 94.9 92.0 - 98.5 %    PO2/FIO2 1.50 mmHg/%    AaDO2 206.4 mmHg    RI(T) 2.74     O2Hb 93.7 (L) 94.0 - 97.0 %    COHb 1.1 0.0 - 1.5 %    MetHb 0.2 0.0 - 1.5 %    O2 Content 18.5 mL/dL    HHb 5.0 0.0 - 5.0 %    tHb (est) 14.0 11.5 - 16.5 g/dL    Mode BIPAP     FIO2 50.0 %    Peep/Cpap 6.0 cmH2O    PS 12 cmH20    Date Of Collection      Time Collected      Pt Temp 37.0 C     ID 1893     Lab [id number]     Critical(s) Notified .  No Critical Values    Procalcitonin   Result Value Ref Range Procalcitonin 0.06 0.00 - 0.08 ng/mL   OSMOLALITY, SERUM   Result Value Ref Range    Osmolality 257 (L) 285 - 310 mOsm/Kg   Urine electrolytes   Result Value Ref Range    Sodium, Ur <20 Not Established mmol/L    Potassium, Ur 12.0 Not Established mmol/L    Chloride 27 Not Established mmol/L   Osmolality, urine   Result Value Ref Range    Osmolality, Ur 508 300 - 900 mOsm/kg   Creatinine, urine, random   Result Value Ref Range    Creatinine, Ur 72 40 - 278 mg/dL   POCT Venous   Result Value Ref Range    POC Sodium 117 (LL) 132 - 146 mmol/L    POC Potassium 2.8 (L) 3.5 - 5.0 mmol/L    POC Chloride 77 (L) 100 - 108 mmol/L    POC Glucose 142 (H) 74 - 109 mg/dl    POC Creatinine 0.4 (L) 0.7 - 1.2 mg/dL    GFR Non-African American >60 >=60 mL/min/1.73    GFR African American >60    EKG 12 Lead   Result Value Ref Range    Ventricular Rate 102 BPM    Atrial Rate 102 BPM    P-R Interval 146 ms    QRS Duration 102 ms    Q-T Interval 360 ms    QTc Calculation (Bazett) 469 ms    P Axis 86 degrees    R Axis -66 degrees    T Axis 86 degrees       RADIOLOGY:  XR CHEST PORTABLE   Final Result   Severe emphysema with  mild vascular congestion with a tiny pleural   effusions. There is no focal consolidation or pleural effusion. Persistent prominence of the superior mediastinum with apical pleural   thickening. If malignancy is clinically suspected, CT evaluation may   be considered in this patient with  severe emphysema. CT Head WO Contrast   Final Result   1. No acute intracranial hemorrhage or edema. 2. Atherosclerotic disease involving intracranial internal carotid   arteries. 3. Chronic small vessel ischemic changes. 4. Age-appropriate atrophy. There is previous right mastoidectomy                         XR CHEST PORTABLE   Final Result   1. Mild diffuse interstitial infiltrates suspicious for atypical   pneumonia and/or mild pulmonary edema.     2.  Indeterminate 6-7 mm nodular opacity overlying the right -   10/16/18 1430 138/84 98 °F (36.7 °C) Oral 100 14 96 % - -   10/16/18 1254 - - - - 17 97 % - -   10/16/18 1253 - - - - 21 98 % - -   10/16/18 1252 - - - - 25 94 % - -   10/16/18 1145 (!) 174/100 97.9 °F (36.6 °C) Oral 104 20 92 % - -   10/16/18 1043 131/75 97.6 °F (36.4 °C) - 96 20 - 5' 8\" (1.727 m) 134 lb (60.8 kg)       Oxygen Saturation Interpretation: Normal    ------------------------------------------ PROGRESS NOTES ------------------------------------------  Re-evaluation(s):  Time: 11:02 AM  Patients symptoms show no change  Repeat physical examination is not changed    Counseling:  I have spoken with the patient and discussed todays results, in addition to providing specific details for the plan of care and counseling regarding the diagnosis and prognosis. Their questions are answered at this time and they are agreeable with the plan of admission.    --------------------------------- ADDITIONAL PROVIDER NOTES ---------------------------------  Consultations:  Time: 4:32 PM. Spoke with Dr. Virginia Norman. Discussed case. They will admit the patient. Time: 4:33 PM. Spoke with Dr. Donato Mariscal. Discussed case. Will see patient in ICU. This patient's ED course included: a personal history and physicial examination, re-evaluation prior to disposition, multiple bedside re-evaluations, cardiac monitoring and continuous pulse oximetry    This patient has remained hemodynamically stable during their ED course. Diagnosis:  1. Respiratory failure with hypoxia and hypercapnia, unspecified chronicity ([facility] [address]    2. Hypo-osmolar hyponatremia        Disposition:  Patient's disposition: Admit to telemetry  Patient's condition is stable.            Renzo Fountain DO  Resident  10/17/18 [id number]

## 2018-10-17 NOTE — CARE COORDINATION
Social Work/Discharge Planning:      SW met with pt sister Shawna Cannon [phone], Pt son Tiara Hull [phone], Pt sister son Turner Smith [phone]. SW discussed d/c planning in regards to [facility]Y with pt and pt sister. Pt sister stated that she feels pt will need PERRY at discharge. Pt sister stated that pt will stay with her after rehab. Pt agreeable to P[facility]Y if needed. SW discussed PERRY choice with pt and pt sister. Pt and pt sister would like for pt to go to [facility] for Rehab after choices. Pt sister son and Pt sister son present at this time and agreeable to [facility] for pt. SW provided pt sister with PERRY list for additional choices if needed as pt and pt family do not have additional choices at this time. ALYSSA made referral to [address] [place] [address] at [facility]. Will need PT/OT kareem, Awaiting Decision, ALYSSA will follow.     Electronically signed by SHAUNNA Vasques on 10/17/2018 at 4:46 PM

## 2018-10-17 NOTE — PLAN OF CARE
Problem: Nutrition  Goal: Optimal nutrition therapy  Outcome: Ongoing  Nutrition Problem: Severe malnutrition, in context of social or environmental circumstances  Intervention: Food and/or Nutrient Delivery: Continue current diet, Start ONS (magic cup TID)  Nutritional Goals: pt to consume >25% meals and ONS

## 2018-10-17 NOTE — PROGRESS NOTES
dentition  []  decreased lingual control  []  vertical munch  []  other)     []  Delayed A-P transit due to ([]  decreased initiation   [] reduced lingual strength   []  cognitive function )    []  Oral residuals []  right buccal cavity  []  left buccal cavity []  sub lingually   []  on tongue base   []  throughout oral cavity     []  on superior tongue       []  on palate               []  on velum              []  anterior sulcus    Comments:      Pharyngeal Stage:  []  Normal   [x]  Functional      []  Abnormal     [x]  No signs of aspiration were noted during this evaluation however, silent aspiration cannot be ruled out at bedside. If silent aspiration is suspected, a Videofluoroscopic Study of Swallowing (MBS) is recommended and requires a physician order.    []  Throat clearing present after presentation of ([]  thin  []  nectar  []  honey  []  pureed  []  solid)    []  Immediate wet cough was noted after presentation of ([]  thin  []  nectar  []  honey  []  pureed  []  Solid)    []  Latent wet cough was noted after presentation of ([]  thin  []  nectar  []  honey  []  pureed  []  solid)    []  Wet respirations were noted after presentation of ([]  thin  []  nectar  []  honey  []  pureed  []  solid)    []  Wet/gurgly vocal quality was noted after presentation of ([]  thin  []  nectar  []  honey  []  pureed  []  Solid)    [] Multiple swallows were noted after presentation of ([]  thin  []  nectar  []  honey  []  pureed  [] solid)    []  Consistent O2  desaturation after the swallow    []  Eye watering/flushing of skin    []  Congested cough throughout evaluation    [] Delayed initiation of the pharyngeal swallow noted    []  Absent swallow                    [] The Speech Language Pathologist (SLP) completed education with the patient regarding results of evaluation.  Explained that Speech Pathology intervention is not warranted at this time      []  Prognosis for improvements is   []  This plan will be

## 2018-10-17 NOTE — CONSULTS
[address] TYMPANOPLASTY  4/18/2007    [facility]. Dr. Rose Sosa       Prior to Admission medications    Medication Sig Start Date End Date Taking? Authorizing Provider   hydrochlorothiazide (MICROZIDE) 12.5 MG capsule Take 1 capsule by mouth daily 10/8/18  Yes Historical Provider, MD       Allergies:  Patient has no known allergies. Social History:   TOBACCO:   reports that he has been smoking. He has a 57.00 pack-year smoking history. He does not have any smokeless tobacco history on file. ETOH:   reports that he does not drink alcohol. OCCUPATION:      Family History:   Family History   Problem Relation Age of Onset    Cancer Father     Cancer Maternal Grandmother       REVIEW OF SYSTEMS:  Constitutional: No fever, no chill  HEENT: No blurred vision, no ear problems, no sore throat, no running nose. Respiratory: No cough, no sputum, no pleuritic chest pain, no shortness of breath  Cardiology: No angina, no dyspnea on exertion, no paroxysmal nocturnal dyspnea, no orthopnea, no palpitation, no leg swelling. Gastroenterology: No dysphagia, no reflux; no abdominal pain, no nausea or vomiting; no constipation or diarrhea. No blood in stool. Genitourinary: No dysuria, no frequency, hesitancy; no hematuria  Musculoskeletal: no joint pain, no myalgia, no change in range of movement  Neurology: no focal weakness in extremities, no slurred speech, no double vision, no tingling or numbness sensation.    Endocrinology: no temperature intolerance, no polyphagia, polydipsia or polyuria  Hematology: no increased bleeding, no bruising, no lymphadenopathy  Skin: no skin change noticed by patient  Psychology: no depressed mood, no suicidal ideation    Physical Exam       VS: BP (!) 106/58   Pulse 81   Temp 98.7 °F (37.1 °C) (Temporal)   Resp 24   Ht 5' 9\" (1.753 m)   Wt 108 lb 11 oz (49.3 kg)   SpO2 96%   BMI 16.05 kg/m²   General Appearance:    Cachectic, on Bipap   HEENT:   NC/AT, PERRLA, no

## 2018-10-17 NOTE — PROGRESS NOTES
[facility] Quality Flow/Interdisciplinary Rounds Progress Note        Quality Flow Rounds held on October 17, 2018    Disciplines Attending:  Bedside Nurse,  and Nursing Unit Leadership    Raymond Padilla was admitted on 10/16/2018 10:40 AM    Anticipated Discharge Date:  Expected Discharge Date: 10/19/18    Disposition:    Joe Score:  Joe Scale Score: 13    Readmission Risk              Risk of Unplanned Readmission:        11             Discussed patient goal for the day, patient clinical progression, and barriers to discharge. The following Goal(s) of the Day/Commitment(s) have been identified:  Monitor Na+ level, await renal input.        Evern Mail  October 17, 2018

## 2018-10-17 NOTE — PROGRESS NOTES
Department of Internal Medicine  Nephrology Attending Consult Note      SUBJECTIVE:  We are following Mr. Malagon for hyponatremia. Reports feeling better.     PHYSICAL EXAM:      Vitals:    VITALS:  BP (!) 166/87   Pulse 90   Temp 97.8 °F (36.6 °C)   Resp 21   Ht 5' 9\" (1.753 m)   Wt 106 lb 7.7 oz (48.3 kg)   SpO2 99%   BMI 15.72 kg/m²   24HR INTAKE/OUTPUT:      Intake/Output Summary (Last 24 hours) at 10/17/18 1019  Last data filed at 10/17/18 0900   Gross per 24 hour   Intake               80 ml   Output              540 ml   Net             -460 ml       Constitutional:  Awake in mild respiratory distress, on BIPAP  HEENT:  PERRLA  Respiratory:  Decreased breath sounds  Cardiovascular/Edema:  Heart sounds are regular rate  Gastrointestinal:  Abdomen is soft nontender  Neurologic:  Non-focal  Skin:  No lesions  Other:  No edema    Scheduled Meds:   folic acid  1 mg Oral Daily    formoterol  20 mcg Nebulization Q12H    budesonide  0.5 mg Nebulization BID    thiamine  100 mg Intravenous Daily    escitalopram  20 mg Oral Daily    enoxaparin  40 mg Subcutaneous Daily    sodium chloride flush  10 mL Intravenous 2 times per day    ipratropium-albuterol  1 ampule Inhalation Q4H WA    methylPREDNISolone  40 mg Intravenous Q12H    cefTRIAXone (ROCEPHIN) IV  1 g Intravenous Q24H     Continuous Infusions:  PRN Meds:.sodium chloride flush, magnesium hydroxide, ondansetron, labetalol      DATA:    CBC:   Lab Results   Component Value Date    WBC 12.6 10/17/2018    RBC 3.96 10/17/2018    HGB 13.2 10/17/2018    HCT 38.3 10/17/2018    MCV 96.7 10/17/2018    MCH 33.3 10/17/2018    MCHC 34.5 10/17/2018    RDW 12.2 10/17/2018     10/17/2018    MPV 9.7 10/17/2018     CMP:    Lab Results   Component Value Date     10/17/2018    K 5.2 10/17/2018    K 5.0 10/17/2018    CL 77 10/17/2018    CO2 31 10/17/2018    BUN 11 10/17/2018    CREATININE 0.4 10/17/2018    GFRAA >60 10/17/2018    LABGLOM >60 10/17/2018 looks dry, the presence of high pro-BNP levels would favor the latter. We will cautiously expand intravascular volume with 3%NaCL and follow Na levels very closely. · Less likely SIADH might be the case only if his Na intake is nil (reflection of his urine Na<20)     2. Double acid base disorder, with respiratory acidosis and metabolic alkalosis (HCTZ)  3. COPD/emphysema  4.  Presumptive pneumonia, on ceftiaxone      Plan:    · 3% NaCL 25 cc/hour x 4 hours   · Avoid rapid sodium correction (less than 10/24 hours, 16/48 hours)  · Call if urine output >100 cc/hour  · Obtain cortisol levels   · Fluid restriction 1 liter   · Monitor sodium levels, BMP every 4 hours

## 2018-10-18 NOTE — PROGRESS NOTES
software.  Every effort was made to ensure accuracy; however, inadvertent computerized transcription errors may be present.

## 2018-10-18 NOTE — PROGRESS NOTES
[facility]  Department of Internal Medicine  Internal Medicine Residency Program  Resident MICU Progress  Note      Patient:  Carlos Alberto Martini 68 y.o. male MRN: [id number]     Date of Service: 10/18/2018    Allergy: Patient has no known allergies. CC: F/u:   Subjective     Mr King Ahuja was seen and examined this morning at bedside. He reports improved SOB with sitting up in bed. Denies CP, fever, chills , nausea, vomiting, night sweat. Objective   Physical Exam:  · Vitals: BP (!) 147/76   Pulse 74   Temp 98.5 °F (36.9 °C) (Temporal)   Resp 22   Ht 5' 9\" (1.753 m)   Wt 102 lb (46.3 kg)   SpO2 100%   BMI 15.06 kg/m²     · I & O - 24hr: In: 505 [P.O.:460; I.V.:45]  · Out: 520 [Urine:520]  · General Appearance: alert, cooperative and mild distress, + cachetic in appearance  · HEENT:  Head: Normocephalic, no lesions, without obvious abnormality. · Neck: no carotid bruit, no JVD and supple, symmetrical, trachea midline  · Lung: diminished breath sounds bilaterally and rales bilaterally  · Heart: regular rate and rhythm and S1, S2 normal  · Abdomen: soft, non-tender; bowel sounds normal; no masses,  no organomegaly  · Extremities:  extremities normal, atraumatic, no cyanosis or edema  · Musculokeletal: No joint swelling, no muscle tenderness. ROM normal in all joints of extremities.    · Neurologic: Mental status: Alert, oriented, thought content appropriate    Pertinent New Labs & Imaging Studies     CBC with Differential:    Lab Results   Component Value Date    WBC 8.6 10/18/2018    RBC 3.98 10/18/2018    HGB 13.2 10/18/2018    HCT 39.2 10/18/2018     10/18/2018    MCV 98.5 10/18/2018    MCH 33.2 10/18/2018    MCHC 33.7 10/18/2018    RDW 12.3 10/18/2018    LYMPHOPCT 1.0 10/17/2018    MONOPCT 1.3 10/17/2018    BASOPCT 0.1 10/17/2018    MONOSABS 0.16 10/17/2018    LYMPHSABS 0.13 10/17/2018    EOSABS 0.06 10/17/2018    BASOSABS 0.01 10/17/2018     CMP:    Lab Results   Component Value Date  10/18/2018    K 4.1 10/18/2018    K 5.0 10/17/2018    CL 81 10/18/2018    CO2 36 10/18/2018    BUN 9 10/18/2018    CREATININE 0.4 10/18/2018    GFRAA >60 10/18/2018    LABGLOM >60 10/18/2018    GLUCOSE 127 10/18/2018    PROT 5.6 10/17/2018    LABALBU 3.1 10/17/2018    CALCIUM 8.4 10/18/2018    BILITOT 0.6 10/17/2018    ALKPHOS 117 10/17/2018    AST 43 10/17/2018    ALT 22 10/17/2018     BMP:    Lab Results   Component Value Date     10/18/2018    K 4.1 10/18/2018    K 5.0 10/17/2018    CL 81 10/18/2018    CO2 36 10/18/2018    BUN 9 10/18/2018    LABALBU 3.1 10/17/2018    CREATININE 0.4 10/18/2018    CALCIUM 8.4 10/18/2018    GFRAA >60 10/18/2018    LABGLOM >60 10/18/2018    GLUCOSE 127 10/18/2018     Hepatic Function Panel:    Lab Results   Component Value Date    ALKPHOS 117 10/17/2018    ALT 22 10/17/2018    AST 43 10/17/2018    PROT 5.6 10/17/2018    BILITOT 0.6 10/17/2018    LABALBU 3.1 10/17/2018     Albumin:    Lab Results   Component Value Date    LABALBU 3.1 10/17/2018     Calcium:    Lab Results   Component Value Date    CALCIUM 8.4 10/18/2018     Magnesium:    Lab Results   Component Value Date    MG 1.9 10/17/2018     Phosphorus:  No results found for: PHOS  LDH:  No results found for: LDH  PT/INR:  No results found for: PROTIME, INR  Warfarin PT/INR:  No components found for: PTPATWAR, PTINRWAR  PTT:  No results found for: APTT, PTT[APTT}  Troponin:    Lab Results   Component Value Date    TROPONINI 0.02 10/16/2018     ABG:    Lab Results   Component Value Date    PH 7.373 10/17/2018    PCO2 55.5 10/16/2018    PO2 75.2 10/16/2018    HCO3 33.8 10/16/2018    BE 8.7 10/17/2018    O2SAT 97.8 10/17/2018     HgBA1c:  No results found for: LABA1C     Notable Cultures:       Culture  Date sent  Result    Nasal      Sputum      Urine Strep pneumonia Ag        Urine Legionella Ag        Blood        Urine          Antibiotic  Days  Day started                                   Oxygen:   Nasal cannula withdrawal  CIWA protocol D/c'd  Correct electrolyte as needed  Cont Thiamine and folic acid      Severe hypotonic hyponatremia - Improving   Likely 2/2 poor solute intake and beer potomania, component of intravascular volume depletion vs CHF  Per nephro: 3% NaCL 25 cc/hour  Avoid rapid sodium correction (less than 10/24 hours, 16/48 hours)   Fluid restriction 1 liter   /24hr  Net negative 202cc  , was 121  Hold HCTZ  Monitor sodium levels, BMP every 2 hours  Correct electrolyte(k, mag and phos) as needed       UTI  Leukocytosis, UA pos for infection  Urine Culture +ve for E. Coli  No SIRS criteria met  Cont Rocephin   Was on Azithromycin for 2 days        GI & DVT prophylaxis Protonix/lovenox  Disposition: Transfer to Aneudy Rose M.D. , PGY-1  Internal Medicine    Attending Physician: Dr. Arie Zavala      I personally saw, examined and provided care for the patient. Radiographs, labs and medication list were reviewed by me independently. I spoke with bedside nursing, therapists and consultants. Critical care services and times documented are independent of procedures and multidisciplinary rounds with Residents. Additionally comprehensive, multidisciplinary rounds were conducted with the MICU team. The case was discussed in detail and plans for care were established. Review of Residents documentation was conducted and revisions were made as appropriate. I agree with the above documented exam, problem list and plan of care. Improving   Ok for transfer   Follow for copd exacerbation   Yovana Martínez M.D.    Pulmonary/Critical Care Medicine

## 2018-10-19 NOTE — PROGRESS NOTES
Palliative Care Department  Palliative Care Initial Consult  Provider: Magon CHILDERS-[facility]    Hospital Day: 4    Referring Provider:  York Barthel, MD    Reason for Consult:  [x]  Code status Discussion  [x]  Assist with goals of care  []  Psychosocial support  []  Symptom Management  []  Advanced Care Planning    Chief Complaint: Bayron Yanez is a 68 y.o. male with chief complaint of weakness/fall    Assessment/Plan      Active Hospital Problems    Diagnosis Date Noted    Respiratory failure with hypoxia and hypercapnia ([facility] [address] [J96.91, J96.92] 10/16/2018   hyponatremia. 1.will have  check to see if patient want to fill out advanced directives and then will sign off      Palliative Care Encounter/Recommendations:      - Goals of care: improve or maintain function/quality of life and continue current management     - Code Status: limited code- bipap only         Subjective:     HPI:  Bayron Yanez is a 68 y.o. male with significant past medical history of Erectile dysfunction; Insomnia; Osteoarthritis; Otitis media, chronic, suppurative; Peptic ulcer disease; Radiculopathy, lumbar region; SBO (small bowel obstruction) ([facility] [address] and Tympanic membrane perforation who presented with weakness. Patient's neighbor reportedly called police, noting the patient was recently discharged from [facility] [place], [place] and had not moved from chair in 5 days and has not eaten in 5 days--only drinking beer. Patient stated to ER physcian \"I don't want to walk because I have nowhere to go\". Patient states that he has not been eating because he is \"not hungry\". Xray showing ? Indeterminate 6-7 mm nodular opacity overlying the right eighth posterior rib, severe emphysema and persistent prominence of the superior mediastinum with apical pleural thickening. He is being treated for acute hypoxic/hypercapnic resp failure and hyponatremia. Subjective/Events/Discussions:  Patient denies sob or chest pian.  Feeling about same as Alert, appears stated age,   Cachectic appearing, in no acute distress  HEENT:  Normocephalic, conjunctiva pink, no drainage, mucosa moist  Neck:  Supple  Lungs:  decreased bilaterally, no audible rhonchi or wheezes noted  Heart[de-identified]  RRR, no murmur, rub, or gallop noted during exam  Abd:  Soft, non tender, non distended, BS+  Ext:  Moving all extremities, no edema, pulses present  Skin:  Warm and dry  Neuro:  PERRL, Alert, oriented x 3; following commands      Current Medications:  Inpatient/Home medications reviewed:    Results/Verification of Data Review  Objective data reviewed: labs, images, records, medication use, vitals and chart      - Advanced Directives: Will ask the LSW to assist with completion   -Surrogate/Legal NOK: sister    Contacts:  sister Naheed Guzmán [phone], Pt son Kitty Barnes [phone]    - Spiritual assessment: No spiritual distress identified     - Bereavement and grief: to be determined    - Discharge planning: discharge to ECF    - Prognosis: fair    - Referrals to: none today    Time/Communication  Greater than 51% of time spent, total 25 minutes in counseling and coordination of care at the bedside regarding goals of care, diagnosis and prognosis and see above. Kalpana CHILDERS-CNP  Palliative Medicine    Discussed patient and the plan of care with the other IDT members of Palliative Med team and with patient and floor nurse. Thank you for allowing Palliative Medicine to participate in the care of Allyne Devyn. Note: This report was completed using computerParkt voiced recognition software. Every effort has been made to ensure accuracy; however, inadvertent computerized transcription errors may be present.

## 2018-10-19 NOTE — PROGRESS NOTES
able to correct with verbal cues. Pt required cues for hand placement during stand>sit. Pt left up in bedside chair with call button in reach, lines attached, and needs met. Pt would benefit from continued PT to improve strength and mobility. Pts/ family goals   1. Get legs stronger     Patient and or family understand(s) diagnosis, prognosis, and plan of care. Yes     PLAN  PT care will be provided in accordance with the objectives noted above. Whenever appropriate, clear delegation orders will be provided for nursing staff. Exercises and functional mobility practice will be used as well as appropriate assistive devices or modalities to obtain goals. Patient and family education will also be administered as needed. Frequency of treatments: 2-5x/week x 7-10 days.     Time in  1130  Time out  [facility] [place], [place]  [id number]

## 2018-10-19 NOTE — PROGRESS NOTES
Dr. Burgess Wagner notified through [employer] about patient is requesting Tylenol for back discomfort, 8/10, stating it is throbbing

## 2018-10-19 NOTE — PROGRESS NOTES
Jacinto Tubbs M.D.,[facility]  Edd Figueroa D.O., FTEOOSOHAM., Fang Alvarez M.D. Mary Anne Cantu M.D., Eveline Tran M.D. Daily Pulmonary Progress Note    Patient:  Nessa Couch 68 y.o. male MRN: [id number]     Date of Service: 10/19/2018      Synopsis   We are following patient for acute hypoxic and hypercapnic respiratory failure, resolving, out of ICU    \"CC\" shortness of breath      Subjective      Patient was seen and examined today. He is currently on 4L and denies cough. Review of Systems:  Constitutional: Denies fever, weight loss, night sweats, and fatigue  Skin: Denies pigmentation, dark lesions, and rashes   HEENT: Denies hearing loss, tinnitus, ear drainage, epistaxis, sore throat, and hoarseness. Cardiovascular: Denies palpitations, chest pain, and chest pressure. Respiratory: Denies cough, dyspnea at rest, hemoptysis, apnea, and choking. Gastrointestinal: Denies nausea, vomiting, poor appetite, diarrhea, heartburn or reflux  Genitourinary: Denies dysuria, frequency, urgency or hematuria  Musculoskeletal: Denies myalgias, muscle weakness, and bone pain  Neurological: Denies dizziness, vertigo, headache, and focal weakness  Psychological: Denies anxiety and depression  Endocrine: Denies heat intolerance and cold intolerance  Hematopoietic/Lymphatic: Denies bleeding problems and blood transfusions    24-hour events: no acute overnight events      Objective   Vitals: /75   Pulse 80   Temp 97.7 °F (36.5 °C) (Temporal)   Resp 18   Ht 5' 9\" (1.753 m)   Wt 104 lb 6.4 oz (47.4 kg)   SpO2 100%   BMI 15.42 kg/m²   Vent Information  FiO2 : 50 %  I Time/ I Time %: 0.9 s       IPAP: 12 cmH20  EPAP: 6 cmH2O     I & O - 24hr  @input@  @output@    Physical Exam:  General Appearance: appears comfortable in no acute distress.    HEENT: Normocephalic atraumatic without obvious abnormality   Neck: Lips, mucosa, and tongue normal.  Supple, symmetrical, trachea midline, Presumptive NEGATIVE for Pneumococcal pneumonia, suggesting  no current or recent pneumococcal infection. Infection due  to S. pneumoniae cannot be ruled out since the antigen present  in the sample may be below the detection limit of the test.       Recent Labs      10/16/18   1814   [id number]  Presumptive NEGATIVE suggesting no recent or current infections  with Legionella pneumophilia serogroup 1. Infection to  Legionella cannot be ruled out since other serogroups and  species may cause infection, antigen may not be present in  early infection, or level of antigen may be below the  detection limit. Assessment:    Acute hypoxic and hypercapnic respiratory failure, resolving  COPD/ severe emphysema  Hyponatremia        Plan:   Continue to wean oxygen as tolerated  Start on PO Prednisone tomorrow  Continue pulmonary regimen: Pulmicort, Perforomist, Duonebs  Continue antibiotics: Rocephin day 3  Refusing Bipap  Consider PFTs as OP  Nephrology following  DVT/GI prophylaxis    This plan of care was reviewed in collaboration with Dr. Dony Baer  Electronically signed by ALVARADO Cordoba CNP on 10/19/2018 at 2:09 PM    I personally saw, examined, and cared for the patient. Labs, medications, radiographs reviewed.  I agree with history exam and plans detailed in NP note with the following additions:    Feeling better, sitting up in bed  Lungs decreased  Change to oral prednisone with taper  Outpatient f/u for PFTs and lung cancer screening CT    Electronically signed by Gamaliel Mcclelland MD on 10/19/2018 at 3:17 PM

## 2018-10-19 NOTE — PROGRESS NOTES
HH [x] SNF/PERRY [] Acute Rehab [] LTAC []Other    +++++++++++++++++++++++++++++++++++++++++++++++++  Mati Carrero MD, Hospitalist  +++++++++++++++++++++++++++++++++++++++++++++++++  NOTE: This report was transcribed using voice recognition software.  Every effort was made to ensure accuracy; however, inadvertent computerized transcription errors may be present.

## 2018-10-20 NOTE — PROGRESS NOTES
hydroxide, ondansetron, labetalol    I/O    Intake/Output Summary (Last 24 hours) at 10/20/18 1833  Last data filed at 10/20/18 1823   Gross per 24 hour   Intake             2468 ml   Output              450 ml   Net             2018 ml       Labs:   Recent Labs      10/18/18   0810   WBC  8.6   HGB  13.2   HCT  39.2   PLT  209       Recent Labs      10/19/18   0513  10/19/18   1115  10/20/18   0517   NA  129*  128*  129*   K  4.1  4.0  4.4   CL  87*  85*  88*   CO2  33*  36*  33*   BUN  8  8  9   CREATININE  0.4*  0.4*  0.4*   CALCIUM  8.1*  8.4*  8.4*   PHOS  1.2*   --   2.3*       No results for input(s): PROT, ALB, ALKPHOS, ALT, AST, BILITOT, AMYLASE, LIPASE in the last 72 hours. No results for input(s): INR in the last 72 hours. No results for input(s): [place] Hosteller in the last 72 hours. Other labs:  No results found for: CHOL, TRIG, HDL, LDLCALC, TSH, PSA, INR, LABA1C    Radiology:  Imaging studies reviewed today.     ASSESSMENT:    UTI  Possible pneumonia  Sepsis  Acute hypoxemic respiratory failure  Sebaceous admission  Hypotonic Hyponatremia  Hypokalemia  Hypoproteinemia  Sacral erosions  Severe protein-calorie malnutrition    PLAN:    IV fluids  Close monitoring of electrolytes  Close monitoring of sodium correction rate  Correct Na< 10 in 24 hours  s/p 3% NaCL  CIWA protocol  Urine culture showing E coli  BiPAP PRN  Continue Ceftriaxone  Thiamine  Discontinued home hydrochlorothiazide  Nebulizer treatment  Solu Medrol iv transitioned to prednisone  Wound care  Severe protein-calorie malnutrition, dietician consulted  Chest CT ordered             Diet: DIET GENERAL; Daily Fluid Restriction: 1000 ml  Dietary Nutrition Supplements: Frozen Oral Supplement  Code Status: Limited    PT/OT Eval Status: [] Ordered [] Evaluation noted [x] Not applicable    DVT Prophylaxis: [x]Lovenox []Heparin []PCD [] [facility]  [x]Encouraged ambulation []N/A    Likely disposition when able:  []Home [] Home with

## 2018-10-20 NOTE — PROGRESS NOTES
HCT 39.2 10/18/2018    MCV 98.5 10/18/2018    MCH 33.2 10/18/2018    MCHC 33.7 10/18/2018    RDW 12.3 10/18/2018     10/18/2018    MPV 9.2 10/18/2018     CMP:    Lab Results   Component Value Date     10/20/2018    K 4.4 10/20/2018    K 5.0 10/17/2018    CL 88 10/20/2018    CO2 33 10/20/2018    BUN 9 10/20/2018    CREATININE 0.4 10/20/2018    GFRAA >60 10/20/2018    LABGLOM >60 10/20/2018    GLUCOSE 88 10/20/2018    PROT 5.6 10/17/2018    LABALBU 3.1 10/17/2018    CALCIUM 8.4 10/20/2018    BILITOT 0.6 10/17/2018    ALKPHOS 117 10/17/2018    AST 43 10/17/2018    ALT 22 10/17/2018     Magnesium:    Lab Results   Component Value Date    MG 2.1 10/18/2018     Phosphorus:    Lab Results   Component Value Date    PHOS 2.3 10/20/2018        Urine Na: <20  ----- <20  Urine K: 18.2  ------- 12  Urine Cl: 27 ---------- 27  Urine osmo: 453 ---- 508    Pro-BNP: 3451 (H)   Radiology Review:      CXR 10/16/18   1.  Mild diffuse interstitial infiltrates suspicious for atypical   pneumonia and/or mild pulmonary edema. 2.  Indeterminate 6-7 mm nodular opacity overlying the right eighth   posterior rib. Consider further evaluation with cross-sectional   imaging.           BRIEF SUMMARY OF INITIAL CONSULT:    Briefly Mr. Ernesto Alcazar is a 81-year-old man with history of alcohol abuse, who was admitted on October 16, 2018 after he presented to the emergency room reporting inability to walk. After evaluation he was found to have severe hyponatremia with a sodium level of 118 mEq/L reason for this consultation. Prior to admission his medications included HCTZ. He denies any nausea vomiting or diarrhea. Admits having very poor oral intake. Although patient denies apparently he might have been drinking some beer over the past few days. IMPRESSION/RECOMMENDATIONS:      1.  Severe hypotonic hyponatremia, with high urine osmolality (453, >100), and low urine Na <20, hemodynamically mediated; either hypovolemia (HCTZ related),

## 2018-10-21 NOTE — PROGRESS NOTES
Patient is having CT scan done so unavailable for exam.  Low phosphate noted.     PLAN:  Check bmp and phosphate in am    Dr Edith Beard

## 2018-10-21 NOTE — PROGRESS NOTES
Hospital Medicine Progress Note      Date of Admission: 10/16/2018  Hospital day # 5    Course: Follow up on hyponatremia    Subjective    Awake, cooperative  No events overnight as per nurse      Exam:    BP (!) 111/55   Pulse 75   Temp 98.8 °F (37.1 °C) (Temporal)   Resp 16   Ht 5' 9\" (1.753 m)   Wt 109 lb (49.4 kg)   SpO2 97%   BMI 16.10 kg/m²     General appearance: Acutely ill, appears stated age and cooperative. HEENT: Pupils equal, round, and reactive to light. Conjunctivae/corneas clear. Neck: Supple. No jugular venous distention. Trachea midline. Respiratory:  Normal respiratory effort. Clear to auscultation, bilaterally without Rales/Wheezes/Rhonchi. Cardiovascular:  S1/S2 without murmurs, rubs or gallops. Abdomen: Soft, non-tender, non-distended with normal bowel sounds. Musculoskeletal: No clubbing, cyanosis or edema bilaterally. Brisk capillary refill. 2+ lower extremity pulses (dorsalis pedis).    Skin:  No rashes    Neurologic: awake, alert and following commands     Medications:  Reviewed    Infusion Medications    sodium chloride 50 mL/hr at 10/20/18 2303     Scheduled Medications    predniSONE  40 mg Oral Daily    Followed by   Yonis Shields ON 10/23/2018] predniSONE  30 mg Oral Daily    Followed by   Yonis Shields ON 10/26/2018] predniSONE  20 mg Oral Daily    Followed by   Yonis Shields ON 10/29/2018] predniSONE  10 mg Oral Daily    pantoprazole  40 mg Oral QAM AC    folic acid  1 mg Oral Daily    formoterol  20 mcg Nebulization Q12H    budesonide  0.5 mg Nebulization BID    cefTRIAXone (ROCEPHIN) IV  1 g Intravenous Q24H    influenza virus vaccine  0.5 mL Intramuscular Prior to discharge    diclofenac  1 patch Transdermal BID    thiamine  100 mg Intravenous Daily    escitalopram  20 mg Oral Daily    enoxaparin  40 mg Subcutaneous Daily    sodium chloride flush  10 mL Intravenous 2 times per day    ipratropium-albuterol  1 ampule Inhalation Q4H WA     PRN Meds: acetaminophen,

## 2018-10-22 NOTE — PLAN OF CARE
Problem: Falls - Risk of:  Goal: Will remain free from falls  Will remain free from falls   Outcome: Met This Shift    Goal: Absence of physical injury  Absence of physical injury   Outcome: Met This Shift      Problem:  Activity:  Goal: Fatigue will decrease  Fatigue will decrease   Outcome: Met This Shift      Problem: Cardiac:  Goal: Hemodynamic stability will improve  Hemodynamic stability will improve   Outcome: Met This Shift

## 2018-10-22 NOTE — PROGRESS NOTES
10/21/2018    RBC 3.09 10/21/2018    HGB 10.3 10/21/2018    HCT 31.0 10/21/2018    .3 10/21/2018    MCH 33.3 10/21/2018    MCHC 33.2 10/21/2018    RDW 12.7 10/21/2018     10/21/2018    MPV 9.4 10/21/2018     CMP:    Lab Results   Component Value Date     10/21/2018    K 4.1 10/21/2018    K 5.0 10/17/2018    CL 92 10/21/2018    CO2 31 10/21/2018    BUN 7 10/21/2018    CREATININE 0.4 10/21/2018    GFRAA >60 10/21/2018    LABGLOM >60 10/21/2018    GLUCOSE 178 10/21/2018    PROT 5.6 10/17/2018    LABALBU 3.1 10/17/2018    CALCIUM 7.9 10/21/2018    BILITOT 0.6 10/17/2018    ALKPHOS 117 10/17/2018    AST 43 10/17/2018    ALT 22 10/17/2018     Magnesium:    Lab Results   Component Value Date    MG 2.1 10/18/2018     Phosphorus:    Lab Results   Component Value Date    PHOS 2.9 10/21/2018        Urine Na: <20  ----- <20  Urine K: 18.2  ------- 12  Urine Cl: 27 ---------- 27  Urine osmo: 453 ---- 508    Pro-BNP: 3451 (H)   Radiology Review:      CXR 10/16/18   1.  Mild diffuse interstitial infiltrates suspicious for atypical   pneumonia and/or mild pulmonary edema. 2.  Indeterminate 6-7 mm nodular opacity overlying the right eighth   posterior rib. Consider further evaluation with cross-sectional   imaging.           BRIEF SUMMARY OF INITIAL CONSULT:    Briefly Mr. King Ahuja is a 81-year-old man with history of alcohol abuse, who was admitted on October 16, 2018 after he presented to the emergency room reporting inability to walk. After evaluation he was found to have severe hyponatremia with a sodium level of 118 mEq/L reason for this consultation. Prior to admission his medications included HCTZ. He denies any nausea vomiting or diarrhea. Admits having very poor oral intake. Although patient denies apparently he might have been drinking some beer over the past few days. IMPRESSION/RECOMMENDATIONS:      1.  Severe hypotonic hyponatremia, with high urine osmolality (453, >100), and low urine Na <20,

## 2018-10-23 NOTE — PROGRESS NOTES
Called  Jorgeduncan Mcdowell, friend and spoke to him and he gave me Aaron Reasoner phone number (son) and I called him next.

## 2018-10-23 NOTE — PROGRESS NOTES
Pts wife called back. Updated her regarding patients passing and spoke with her about  arrangements.  Her wishes are that he be released to  on [facility]

## 2018-10-23 NOTE — PROGRESS NOTES
Attempted to call the wife, Minus Eduquia - Answ[employer] Machine picked up and I left a generic message to please call us back about her  Silvio Lopez.